# Patient Record
Sex: MALE | Race: WHITE | NOT HISPANIC OR LATINO | Employment: OTHER | URBAN - METROPOLITAN AREA
[De-identification: names, ages, dates, MRNs, and addresses within clinical notes are randomized per-mention and may not be internally consistent; named-entity substitution may affect disease eponyms.]

---

## 2017-01-16 ENCOUNTER — COMMUNICATION - HEALTHEAST (OUTPATIENT)
Dept: FAMILY MEDICINE | Facility: CLINIC | Age: 67
End: 2017-01-16

## 2017-01-16 ENCOUNTER — OFFICE VISIT - HEALTHEAST (OUTPATIENT)
Dept: FAMILY MEDICINE | Facility: CLINIC | Age: 67
End: 2017-01-16

## 2017-01-16 DIAGNOSIS — E11.9 DIABETES MELLITUS (H): ICD-10-CM

## 2017-01-16 DIAGNOSIS — E78.5 HYPERLIPIDEMIA, UNSPECIFIED HYPERLIPIDEMIA TYPE: ICD-10-CM

## 2017-01-16 DIAGNOSIS — E11.9 DM (DIABETES MELLITUS) (H): ICD-10-CM

## 2017-01-16 DIAGNOSIS — G47.30 SLEEP APNEA: ICD-10-CM

## 2017-01-16 LAB — HBA1C MFR BLD: 7.1 % (ref 3.5–6)

## 2017-01-16 ASSESSMENT — MIFFLIN-ST. JEOR: SCORE: 1629.89

## 2017-01-17 LAB
CHOLEST SERPL-MCNC: 146 MG/DL
FASTING STATUS PATIENT QL REPORTED: NORMAL
HDLC SERPL-MCNC: 41 MG/DL
LDLC SERPL CALC-MCNC: 88 MG/DL
TRIGL SERPL-MCNC: 85 MG/DL

## 2017-01-18 ENCOUNTER — COMMUNICATION - HEALTHEAST (OUTPATIENT)
Dept: FAMILY MEDICINE | Facility: CLINIC | Age: 67
End: 2017-01-18

## 2017-02-20 ENCOUNTER — COMMUNICATION - HEALTHEAST (OUTPATIENT)
Dept: FAMILY MEDICINE | Facility: CLINIC | Age: 67
End: 2017-02-20

## 2017-02-20 DIAGNOSIS — E11.9 DM (DIABETES MELLITUS) (H): ICD-10-CM

## 2017-05-25 ENCOUNTER — COMMUNICATION - HEALTHEAST (OUTPATIENT)
Dept: FAMILY MEDICINE | Facility: CLINIC | Age: 67
End: 2017-05-25

## 2017-05-25 DIAGNOSIS — E78.5 HYPERLIPIDEMIA: ICD-10-CM

## 2017-05-26 ENCOUNTER — COMMUNICATION - HEALTHEAST (OUTPATIENT)
Dept: FAMILY MEDICINE | Facility: CLINIC | Age: 67
End: 2017-05-26

## 2017-05-26 DIAGNOSIS — E78.5 HYPERLIPIDEMIA: ICD-10-CM

## 2017-08-24 ENCOUNTER — COMMUNICATION - HEALTHEAST (OUTPATIENT)
Dept: FAMILY MEDICINE | Facility: CLINIC | Age: 67
End: 2017-08-24

## 2017-08-24 DIAGNOSIS — E11.9 DM (DIABETES MELLITUS) (H): ICD-10-CM

## 2017-09-15 ENCOUNTER — OFFICE VISIT - HEALTHEAST (OUTPATIENT)
Dept: FAMILY MEDICINE | Facility: CLINIC | Age: 67
End: 2017-09-15

## 2017-09-15 DIAGNOSIS — E11.9 DIABETES MELLITUS (H): ICD-10-CM

## 2017-09-15 DIAGNOSIS — E78.5 HYPERLIPIDEMIA, UNSPECIFIED HYPERLIPIDEMIA TYPE: ICD-10-CM

## 2017-09-15 LAB — HBA1C MFR BLD: 6.7 % (ref 3.5–6)

## 2017-09-19 ENCOUNTER — COMMUNICATION - HEALTHEAST (OUTPATIENT)
Dept: FAMILY MEDICINE | Facility: CLINIC | Age: 67
End: 2017-09-19

## 2017-09-27 ENCOUNTER — RECORDS - HEALTHEAST (OUTPATIENT)
Dept: ADMINISTRATIVE | Facility: OTHER | Age: 67
End: 2017-09-27

## 2018-04-10 ENCOUNTER — COMMUNICATION - HEALTHEAST (OUTPATIENT)
Dept: FAMILY MEDICINE | Facility: CLINIC | Age: 68
End: 2018-04-10

## 2018-04-10 DIAGNOSIS — E78.5 HYPERLIPIDEMIA: ICD-10-CM

## 2018-07-03 ENCOUNTER — OFFICE VISIT - HEALTHEAST (OUTPATIENT)
Dept: FAMILY MEDICINE | Facility: CLINIC | Age: 68
End: 2018-07-03

## 2018-07-03 DIAGNOSIS — E11.9 DIABETES MELLITUS (H): ICD-10-CM

## 2018-07-03 DIAGNOSIS — Z12.11 SCREEN FOR COLON CANCER: ICD-10-CM

## 2018-07-03 LAB
HBA1C MFR BLD: 7.4 % (ref 3.5–6)
LDLC SERPL CALC-MCNC: 82 MG/DL

## 2018-07-05 ENCOUNTER — COMMUNICATION - HEALTHEAST (OUTPATIENT)
Dept: FAMILY MEDICINE | Facility: CLINIC | Age: 68
End: 2018-07-05

## 2018-08-27 ENCOUNTER — COMMUNICATION - HEALTHEAST (OUTPATIENT)
Dept: FAMILY MEDICINE | Facility: CLINIC | Age: 68
End: 2018-08-27

## 2018-08-27 DIAGNOSIS — E11.9 DM (DIABETES MELLITUS) (H): ICD-10-CM

## 2018-10-29 ENCOUNTER — COMMUNICATION - HEALTHEAST (OUTPATIENT)
Dept: FAMILY MEDICINE | Facility: CLINIC | Age: 68
End: 2018-10-29

## 2018-10-29 DIAGNOSIS — E78.5 HYPERLIPIDEMIA: ICD-10-CM

## 2019-07-08 ENCOUNTER — COMMUNICATION - HEALTHEAST (OUTPATIENT)
Dept: FAMILY MEDICINE | Facility: CLINIC | Age: 69
End: 2019-07-08

## 2019-07-10 ENCOUNTER — OFFICE VISIT - HEALTHEAST (OUTPATIENT)
Dept: FAMILY MEDICINE | Facility: CLINIC | Age: 69
End: 2019-07-10

## 2019-07-10 ENCOUNTER — COMMUNICATION - HEALTHEAST (OUTPATIENT)
Dept: TELEHEALTH | Facility: CLINIC | Age: 69
End: 2019-07-10

## 2019-07-10 DIAGNOSIS — Z23 NEED FOR VACCINATION: ICD-10-CM

## 2019-07-10 DIAGNOSIS — E11.9 DM (DIABETES MELLITUS) (H): ICD-10-CM

## 2019-07-10 DIAGNOSIS — Z00.00 HEALTHCARE MAINTENANCE: ICD-10-CM

## 2019-07-10 DIAGNOSIS — E78.2 MIXED HYPERLIPIDEMIA: ICD-10-CM

## 2019-07-10 DIAGNOSIS — Z12.11 SCREEN FOR COLON CANCER: ICD-10-CM

## 2019-07-10 DIAGNOSIS — E11.9 DIABETES MELLITUS, TYPE 2 (H): ICD-10-CM

## 2019-07-10 LAB
CHOLEST SERPL-MCNC: 145 MG/DL
FASTING STATUS PATIENT QL REPORTED: NORMAL
HBA1C MFR BLD: 8.1 % (ref 3.5–6)
HDLC SERPL-MCNC: 41 MG/DL
LDLC SERPL CALC-MCNC: 89 MG/DL
TRIGL SERPL-MCNC: 77 MG/DL

## 2019-07-10 ASSESSMENT — MIFFLIN-ST. JEOR: SCORE: 1638.06

## 2019-07-16 ENCOUNTER — COMMUNICATION - HEALTHEAST (OUTPATIENT)
Dept: FAMILY MEDICINE | Facility: CLINIC | Age: 69
End: 2019-07-16

## 2019-07-29 ENCOUNTER — COMMUNICATION - HEALTHEAST (OUTPATIENT)
Dept: FAMILY MEDICINE | Facility: CLINIC | Age: 69
End: 2019-07-29

## 2019-07-29 DIAGNOSIS — E11.9 DIABETES MELLITUS, TYPE 2 (H): ICD-10-CM

## 2019-07-31 ENCOUNTER — COMMUNICATION - HEALTHEAST (OUTPATIENT)
Dept: FAMILY MEDICINE | Facility: CLINIC | Age: 69
End: 2019-07-31

## 2019-09-13 ENCOUNTER — COMMUNICATION - HEALTHEAST (OUTPATIENT)
Dept: FAMILY MEDICINE | Facility: CLINIC | Age: 69
End: 2019-09-13

## 2019-09-26 ENCOUNTER — COMMUNICATION - HEALTHEAST (OUTPATIENT)
Dept: FAMILY MEDICINE | Facility: CLINIC | Age: 69
End: 2019-09-26

## 2019-09-26 DIAGNOSIS — E78.5 HYPERLIPIDEMIA: ICD-10-CM

## 2019-10-25 ENCOUNTER — RECORDS - HEALTHEAST (OUTPATIENT)
Dept: ADMINISTRATIVE | Facility: OTHER | Age: 69
End: 2019-10-25

## 2019-10-31 ENCOUNTER — RECORDS - HEALTHEAST (OUTPATIENT)
Dept: HEALTH INFORMATION MANAGEMENT | Facility: CLINIC | Age: 69
End: 2019-10-31

## 2019-11-04 ENCOUNTER — AMBULATORY - HEALTHEAST (OUTPATIENT)
Dept: NURSING | Facility: CLINIC | Age: 69
End: 2019-11-04

## 2019-11-04 DIAGNOSIS — Z23 NEED FOR INFLUENZA VACCINATION: ICD-10-CM

## 2020-02-13 ENCOUNTER — COMMUNICATION - HEALTHEAST (OUTPATIENT)
Dept: LAB | Facility: CLINIC | Age: 70
End: 2020-02-13

## 2020-02-13 DIAGNOSIS — E11.9 TYPE 2 DIABETES MELLITUS WITHOUT COMPLICATION, WITHOUT LONG-TERM CURRENT USE OF INSULIN (H): ICD-10-CM

## 2020-02-14 ENCOUNTER — AMBULATORY - HEALTHEAST (OUTPATIENT)
Dept: LAB | Facility: CLINIC | Age: 70
End: 2020-02-14

## 2020-02-14 ENCOUNTER — COMMUNICATION - HEALTHEAST (OUTPATIENT)
Dept: FAMILY MEDICINE | Facility: CLINIC | Age: 70
End: 2020-02-14

## 2020-02-14 DIAGNOSIS — E11.9 DIABETES MELLITUS, TYPE 2 (H): ICD-10-CM

## 2020-02-14 DIAGNOSIS — E11.9 TYPE 2 DIABETES MELLITUS WITHOUT COMPLICATION, WITHOUT LONG-TERM CURRENT USE OF INSULIN (H): ICD-10-CM

## 2020-02-14 LAB
ALBUMIN SERPL-MCNC: 4.2 G/DL (ref 3.5–5)
ALP SERPL-CCNC: 85 U/L (ref 45–120)
ALT SERPL W P-5'-P-CCNC: 25 U/L (ref 0–45)
ANION GAP SERPL CALCULATED.3IONS-SCNC: 9 MMOL/L (ref 5–18)
AST SERPL W P-5'-P-CCNC: 21 U/L (ref 0–40)
BILIRUB SERPL-MCNC: 0.8 MG/DL (ref 0–1)
BUN SERPL-MCNC: 16 MG/DL (ref 8–22)
CALCIUM SERPL-MCNC: 9.4 MG/DL (ref 8.5–10.5)
CHLORIDE BLD-SCNC: 102 MMOL/L (ref 98–107)
CHOLEST SERPL-MCNC: 146 MG/DL
CO2 SERPL-SCNC: 27 MMOL/L (ref 22–31)
CREAT SERPL-MCNC: 0.97 MG/DL (ref 0.7–1.3)
CREAT UR-MCNC: 154.4 MG/DL
FASTING STATUS PATIENT QL REPORTED: YES
GFR SERPL CREATININE-BSD FRML MDRD: >60 ML/MIN/1.73M2
GLUCOSE BLD-MCNC: 182 MG/DL (ref 70–125)
HBA1C MFR BLD: 7.2 % (ref 3.5–6)
HDLC SERPL-MCNC: 37 MG/DL
LDLC SERPL CALC-MCNC: 84 MG/DL
MICROALBUMIN UR-MCNC: 1.38 MG/DL (ref 0–1.99)
MICROALBUMIN/CREAT UR: 8.9 MG/G
POTASSIUM BLD-SCNC: 4.2 MMOL/L (ref 3.5–5)
PROT SERPL-MCNC: 7.3 G/DL (ref 6–8)
SODIUM SERPL-SCNC: 138 MMOL/L (ref 136–145)
TRIGL SERPL-MCNC: 123 MG/DL

## 2020-02-16 ENCOUNTER — COMMUNICATION - HEALTHEAST (OUTPATIENT)
Dept: FAMILY MEDICINE | Facility: CLINIC | Age: 70
End: 2020-02-16

## 2020-02-19 ENCOUNTER — COMMUNICATION - HEALTHEAST (OUTPATIENT)
Dept: FAMILY MEDICINE | Facility: CLINIC | Age: 70
End: 2020-02-19

## 2020-02-19 DIAGNOSIS — E11.9 DIABETES MELLITUS, TYPE 2 (H): ICD-10-CM

## 2020-02-21 ENCOUNTER — COMMUNICATION - HEALTHEAST (OUTPATIENT)
Dept: FAMILY MEDICINE | Facility: CLINIC | Age: 70
End: 2020-02-21

## 2020-02-21 DIAGNOSIS — E11.9 DIABETES MELLITUS, TYPE 2 (H): ICD-10-CM

## 2020-05-15 ENCOUNTER — COMMUNICATION - HEALTHEAST (OUTPATIENT)
Dept: FAMILY MEDICINE | Facility: CLINIC | Age: 70
End: 2020-05-15

## 2020-05-15 DIAGNOSIS — E11.9 DIABETES MELLITUS, TYPE 2 (H): ICD-10-CM

## 2020-06-19 ENCOUNTER — AMBULATORY - HEALTHEAST (OUTPATIENT)
Dept: LAB | Facility: CLINIC | Age: 70
End: 2020-06-19

## 2020-06-19 DIAGNOSIS — E11.9 TYPE 2 DIABETES MELLITUS WITHOUT COMPLICATION, WITHOUT LONG-TERM CURRENT USE OF INSULIN (H): ICD-10-CM

## 2020-06-19 LAB — HBA1C MFR BLD: 7 % (ref 3.5–6)

## 2020-07-13 ENCOUNTER — COMMUNICATION - HEALTHEAST (OUTPATIENT)
Dept: FAMILY MEDICINE | Facility: CLINIC | Age: 70
End: 2020-07-13

## 2020-08-03 ENCOUNTER — COMMUNICATION - HEALTHEAST (OUTPATIENT)
Dept: FAMILY MEDICINE | Facility: CLINIC | Age: 70
End: 2020-08-03

## 2020-08-03 DIAGNOSIS — E11.9 DIABETES MELLITUS, TYPE 2 (H): ICD-10-CM

## 2020-09-28 ENCOUNTER — COMMUNICATION - HEALTHEAST (OUTPATIENT)
Dept: FAMILY MEDICINE | Facility: CLINIC | Age: 70
End: 2020-09-28

## 2020-09-28 DIAGNOSIS — E78.5 HYPERLIPIDEMIA: ICD-10-CM

## 2020-10-26 ENCOUNTER — RECORDS - HEALTHEAST (OUTPATIENT)
Dept: ADMINISTRATIVE | Facility: OTHER | Age: 70
End: 2020-10-26

## 2020-10-26 LAB — RETINOPATHY: NEGATIVE

## 2020-10-30 ENCOUNTER — RECORDS - HEALTHEAST (OUTPATIENT)
Dept: HEALTH INFORMATION MANAGEMENT | Facility: CLINIC | Age: 70
End: 2020-10-30

## 2020-11-12 ENCOUNTER — COMMUNICATION - HEALTHEAST (OUTPATIENT)
Dept: SCHEDULING | Facility: CLINIC | Age: 70
End: 2020-11-12

## 2020-11-19 ENCOUNTER — RECORDS - HEALTHEAST (OUTPATIENT)
Dept: ADMINISTRATIVE | Facility: OTHER | Age: 70
End: 2020-11-19

## 2021-02-08 ENCOUNTER — COMMUNICATION - HEALTHEAST (OUTPATIENT)
Dept: FAMILY MEDICINE | Facility: CLINIC | Age: 71
End: 2021-02-08

## 2021-02-08 DIAGNOSIS — E78.5 HYPERLIPIDEMIA: ICD-10-CM

## 2021-03-08 ENCOUNTER — OFFICE VISIT - HEALTHEAST (OUTPATIENT)
Dept: FAMILY MEDICINE | Facility: CLINIC | Age: 71
End: 2021-03-08

## 2021-03-08 ENCOUNTER — COMMUNICATION - HEALTHEAST (OUTPATIENT)
Dept: TELEHEALTH | Facility: CLINIC | Age: 71
End: 2021-03-08

## 2021-03-08 DIAGNOSIS — E78.2 MIXED HYPERLIPIDEMIA: ICD-10-CM

## 2021-03-08 DIAGNOSIS — E11.9 TYPE 2 DIABETES MELLITUS WITHOUT COMPLICATION, WITHOUT LONG-TERM CURRENT USE OF INSULIN (H): ICD-10-CM

## 2021-03-08 LAB
ALBUMIN SERPL-MCNC: 4.3 G/DL (ref 3.5–5)
ALP SERPL-CCNC: 95 U/L (ref 45–120)
ALT SERPL W P-5'-P-CCNC: 26 U/L (ref 0–45)
ANION GAP SERPL CALCULATED.3IONS-SCNC: 10 MMOL/L (ref 5–18)
AST SERPL W P-5'-P-CCNC: 19 U/L (ref 0–40)
BILIRUB SERPL-MCNC: 0.5 MG/DL (ref 0–1)
BUN SERPL-MCNC: 17 MG/DL (ref 8–28)
CALCIUM SERPL-MCNC: 8.7 MG/DL (ref 8.5–10.5)
CHLORIDE BLD-SCNC: 107 MMOL/L (ref 98–107)
CHOLEST SERPL-MCNC: 133 MG/DL
CO2 SERPL-SCNC: 25 MMOL/L (ref 22–31)
CREAT SERPL-MCNC: 1.09 MG/DL (ref 0.7–1.3)
CREAT UR-MCNC: 172.2 MG/DL
GFR SERPL CREATININE-BSD FRML MDRD: >60 ML/MIN/1.73M2
GLUCOSE BLD-MCNC: 170 MG/DL (ref 70–125)
HBA1C MFR BLD: 7.9 %
HDLC SERPL-MCNC: 39 MG/DL
LDLC SERPL CALC-MCNC: 71 MG/DL
MICROALBUMIN UR-MCNC: 1.14 MG/DL (ref 0–1.99)
MICROALBUMIN/CREAT UR: 6.6 MG/G
POTASSIUM BLD-SCNC: 4.3 MMOL/L (ref 3.5–5)
PROT SERPL-MCNC: 7.4 G/DL (ref 6–8)
SODIUM SERPL-SCNC: 142 MMOL/L (ref 136–145)
TRIGL SERPL-MCNC: 113 MG/DL

## 2021-03-11 ENCOUNTER — COMMUNICATION - HEALTHEAST (OUTPATIENT)
Dept: FAMILY MEDICINE | Facility: CLINIC | Age: 71
End: 2021-03-11

## 2021-05-13 ENCOUNTER — COMMUNICATION - HEALTHEAST (OUTPATIENT)
Dept: FAMILY MEDICINE | Facility: CLINIC | Age: 71
End: 2021-05-13

## 2021-05-24 ENCOUNTER — COMMUNICATION - HEALTHEAST (OUTPATIENT)
Dept: FAMILY MEDICINE | Facility: CLINIC | Age: 71
End: 2021-05-24

## 2021-05-24 DIAGNOSIS — E78.5 HYPERLIPIDEMIA: ICD-10-CM

## 2021-05-24 DIAGNOSIS — E11.9 DIABETES MELLITUS, TYPE 2 (H): ICD-10-CM

## 2021-05-25 RX ORDER — SIMVASTATIN 20 MG
TABLET ORAL
Qty: 90 TABLET | Refills: 3 | Status: SHIPPED | OUTPATIENT
Start: 2021-05-25 | End: 2022-08-24

## 2021-05-25 RX ORDER — GLIPIZIDE 5 MG/1
TABLET, FILM COATED, EXTENDED RELEASE ORAL
Qty: 90 TABLET | Refills: 3 | Status: SHIPPED | OUTPATIENT
Start: 2021-05-25 | End: 2022-05-23

## 2021-05-30 VITALS — HEIGHT: 69 IN | WEIGHT: 195 LBS | BODY MASS INDEX: 28.88 KG/M2

## 2021-05-30 NOTE — TELEPHONE ENCOUNTER
This patient is well over due for an office visit to follow-up on diabetes  He was seen last 7/3/2018 and instructed to follow-up in 6 months      Please help schedule a DM check with an available provider

## 2021-05-30 NOTE — TELEPHONE ENCOUNTER
Return to taking 2 tablets of metformin per day.  I have sent a prescription for a new medication called glipizide which is to be taken once a day in addition to the 2 times a day of metformin.  This should work in a different way and not cause diarrhea with blood sugar.  As always would recommend he follow a diet low in sugar and carbohydrates to also help achieve ideal blood sugar control

## 2021-05-30 NOTE — TELEPHONE ENCOUNTER
Left message to call back for: Patient.  Information to relay to patient: Patient is overdue for a diabetic check with Dr. Curry.  Please help him schedule.

## 2021-05-30 NOTE — TELEPHONE ENCOUNTER
Patient Returning Call  Reason for call:  Calling clinic back  Information relayed to patient:  yes  Patient has additional questions:  No  If YES, what are your questions/concerns:  Scheduled for 7/9 for a diabetic check  Okay to leave a detailed message?: No call back needed

## 2021-05-30 NOTE — PROGRESS NOTES
I spent over40 minutes with the patient with greater than 50% spent discussing symptoms, treatment options, and coordination of care.     Assessment/Plan:    1. Diabetes mellitus, type 2 (H)  Hemoglobin A1c increased today at 8.1.  Will increase metformin to 1000 mg twice daily with meals. Referral placed to diabetes education to discuss diet and lifestyle modifications.  Diabetic foot exam completed today.  Patient has seen ophthalmology within the past year.  Will check microalbumin today.  Anticipate follow-up in clinic in 3 months to recheck A1c.    - Glycosylated Hemoglobin A1c  - Microalbumin, Random Urine  - metFORMIN (GLUCOPHAGE) 500 MG tablet; Take 2 tablets (1,000 mg total) by mouth 2 (two) times a day with meals.  Dispense: 180 tablet; Refill: 3  - Ambulatory referral to Diabetes Education Program (CDE)    2. Mixed hyperlipidemia  History of hyperlipidemia.  Will check fasting lipids today.  Continue with simvastatin as prescribed.  - Lipid [place] FASTING    3. Healthcare maintenance  Patient is due for tetanus booster.  Administered today without complication.  - Tdap vaccine,  8yo or older,  IM    4. Screen for colon cancer  Discussed importance of routine colon cancer screening.  Referral placed for colonoscopy.  - Ambulatory referral for Colonoscopy    6. Need for vaccination  - Pneumococcal conjugate vaccine 13-valent 6wks-17yrs; >50yrs      The following high BMI interventions were performed this visit: encouragement to exercise and lifestyle education regarding diet    Subjective:    Ankush Zaidi is a 69 year old male seen today for follow-up visit of type 2 diabetes and hyperlipidemia.  Patient was last seen in clinic roughly 1 year ago.  At that time A1c had increased from 6.7-7.4.  Patient is currently on metformin 500 mg twice daily.  Tolerating well.  He is checking blood sugars at least once a day.  Typically blood sugars are less than 150 fasting.  On occasion will have a reading above  175.  Patient's A1c has increased today to 8.1.  Feels that it is likely diet related.  States that he is not watching diet like he should be.  Will eat fast food on occasion as well as heavy carbohydrate meals including spaghetti.  Tries to consume diet soda.  Patient tries to stay fairly active day today, walks often.  He continues use of simvastatin and aspirin daily in addition to metformin.  Otherwise no regular medications.  He feels well overall today.  Denies any chest pain or shortness of breath.  He is due for routine health maintenance today.  Patient sees ophthalmology annually.  He does not have any additional concerns today.  Review of systems is as stated in HPI, and the remainder of the 10 system review is otherwise unremarkable.    Past Medical History, Family History, and Social History reviewed.    History reviewed. No pertinent surgical history.     No family history on file.     History reviewed. No pertinent past medical history.     Social History     Tobacco Use     Smoking status: Former Smoker     Types: Cigarettes     Last attempt to quit: 1969     Years since quittin.8     Smokeless tobacco: Never Used   Substance Use Topics     Alcohol use: Yes     Alcohol/week: 2.0 oz     Types: 4 drink(s) per week     Drug use: No        Current Outpatient Medications   Medication Sig Dispense Refill     aspirin 81 MG EC tablet Take 81 mg by mouth daily.       cinnamon bark 500 mg capsule Take 500 mg by mouth daily.       metFORMIN (GLUCOPHAGE) 500 MG tablet Take 2 tablets (1,000 mg total) by mouth 2 (two) times a day with meals. 180 tablet 3     simvastatin (ZOCOR) 20 MG tablet Take 1 tablet (20 mg total) by mouth at bedtime. 90 tablet 2     No current facility-administered medications for this visit.           Objective:    Vitals:    07/10/19 0925   BP: 132/80   Patient Site: Right Arm   Patient Position: Sitting   Cuff Size: Adult Regular   Pulse: 66   Weight: 196 lb 12.8 oz (89.3 kg)  "  Height: 5' 9\" (1.753 m)      Body mass index is 29.06 kg/m .      General Appearance:  Alert, cooperative, no distress, appears stated age   HEENT:  Normal.  No acute findings.   Neck: Supple, symmetrical, no adenopathy.   Lungs:   Clear to auscultation bilaterally, respirations unlabored.  No expiratory wheeze or inspiratory crackles noted.   Heart:  Regular rate and rhythm, S1, S2 normal, no murmur, rub or gallop   Extremities:  Normal diabetic foot exam today.  Extremities normal.  No cyanosis or edema   Skin: Warm, dry.  Skin color, texture, turgor normal, no rashes or lesions   Neurologic:  Alert and oriented x3.  Grossly intact.       This note has been dictated using voice recognition software. Any grammatical or context distortions are unintentional and inherent to the use of this software.     "

## 2021-05-30 NOTE — TELEPHONE ENCOUNTER
Patient Returning Call  Reason for call:  Return call  Information relayed to patient:  Patient was informed of Lance Curry MD's message below about the metformin Rx change and the new Rx for glipizide.  Patient has additional questions:  No  If YES, what are your questions/concerns:  n/a  Okay to leave a detailed message?: No call back needed

## 2021-05-30 NOTE — TELEPHONE ENCOUNTER
Orders being requested: Hemoglobin A1c  Reason service is needed/diagnosis: Patient states he is due to have this done  When are orders needed by: not urgent, but patient stated he would like to come in to have this drawn today if possible.  Where to send Orders: Chart  Okay to leave detailed message?  Yes  [phone]    Patient is requesting a phone call when the order has been placed so he can set up a time to have this test drawn.

## 2021-05-30 NOTE — TELEPHONE ENCOUNTER
"Medication Question or Clarification  Who is calling: Patient  What medication are you calling about? (include dose and sig)     metFORMIN (GLUCOPHAGE) 500 MG tablet 180 tablet 3 7/10/2019     Sig - Route: Take 2 tablets (1,000 mg total) by mouth 2 (two) times a day with meals. - Oral    Sent to pharmacy as: metFORMIN (GLUCOPHAGE) 500 MG tablet      Who prescribed the medication?:Gemini Bain, MARIO ALBERTO  What is your question/concern?: The patient states that since the increase of the above prescription that he is unable to keep anything in his stomach. Symptoms present \"Off and onn for two weeks.\"     The patient would like to know if OK to go back to two tablets of metformin and work to on diet instead of staying of the increased dose of the metformin.     Pharmacy: [id number] - [place], [place] - [address]   Okay to leave a detailed message?: No  Site CMT - Please call the pharmacy to obtain any additional needed information.  "

## 2021-05-31 VITALS — BODY MASS INDEX: 28.5 KG/M2 | WEIGHT: 193 LBS

## 2021-06-01 VITALS — WEIGHT: 196 LBS | BODY MASS INDEX: 28.94 KG/M2

## 2021-06-01 NOTE — TELEPHONE ENCOUNTER
Reason contacted:  Medication problem  Information relayed:  Below message. Patient will begin taking Metformin 500 mg twice daily as this is the dose patient has previously tolerated.     He will follow-up in no more than 3 months.     Additional questions:  No  Further follow-up needed:  No  Okay to leave a detailed message:  No

## 2021-06-01 NOTE — TELEPHONE ENCOUNTER
Patient would like a refill on  His simvasatin  20mg  Called to [facility] pharmacy  [phone]

## 2021-06-01 NOTE — TELEPHONE ENCOUNTER
There are many choices of other medications.  Choices for oral medications are somewhat limited.  Have him stop taking the glipizide.  Return to taking the previous dose of metformin that did not cause problems.  Allow symptoms to resolve.  Work diligently at diet to improve blood sugar.  Schedule follow-up with me for no more than 3 months and we will reevaluate the situation and discuss options for other medications if necessary.

## 2021-06-01 NOTE — TELEPHONE ENCOUNTER
Medication Question or Clarification  Who is calling: Patient  What medication are you calling about? (include dose and sig)    Disp Refills Start End    glipiZIDE (GLUCOTROL XL) 5 MG 24 hr tablet 30 tablet 3 7/29/2019     Sig - Route: Take 1 tablet (5 mg total) by mouth daily with breakfast. - Oral    Sent to pharmacy as: glipiZIDE (GLUCOTROL XL) 5 MG 24 hr tablet    E-Prescribing Status: Receipt confirmed by pharmacy (7/29/2019  4:50         Who prescribed the medication?: Lance Curry MD    What is your question/concern?: Patient is experiencing side affects with this medication.  Has frequent stooling, a lot of this is very loose, stomach is always in a knot.  Would like to trial a new medication that will hopefully not have these or any side affects.  Pharmacy: [id number]  Okay to leave a detailed message?: Yes  Site CMT - Please call the pharmacy to obtain any additional needed information.    Patient would really appreciate a callback today.

## 2021-06-03 VITALS — HEIGHT: 69 IN | WEIGHT: 196.8 LBS | BODY MASS INDEX: 29.15 KG/M2

## 2021-06-05 VITALS
SYSTOLIC BLOOD PRESSURE: 126 MMHG | DIASTOLIC BLOOD PRESSURE: 76 MMHG | BODY MASS INDEX: 29.53 KG/M2 | WEIGHT: 200 LBS | OXYGEN SATURATION: 99 % | HEART RATE: 70 BPM

## 2021-06-06 NOTE — TELEPHONE ENCOUNTER
Who is calling:  Patient  Reason for Call:    Patient is wanting to  medication today.  Thank you.  Date of last appointment with primary care: 7/10/19  Okay to leave a detailed message: Yes

## 2021-06-06 NOTE — TELEPHONE ENCOUNTER
Patient has run out of the medications, needs ASAP today please    Refill Request  Did you contact pharmacy: Yes  Medication name:   Requested Prescriptions     Pending Prescriptions Disp Refills     glipiZIDE (GLUCOTROL XL) 5 MG 24 hr tablet [Pharmacy Med Name: glipiZIDE ER 5 MG Oral Tablet Extended Release 24 Hour] 30 tablet 0     Sig: TAKE 1 TABLET BY MOUTH ONCE DAILY WITH BREAKFAST     Who prescribed the medication: Lance Curry MD  Requested Pharmacy: [facility]  Is patient out of medication: Yes  Patient notified refills processed in 3 business days:  yes  Okay to leave a detailed message: yes

## 2021-06-06 NOTE — TELEPHONE ENCOUNTER
Refill Approved    Rx renewed per Medication Renewal Policy. Medication was last renewed on 2/21/2020. Message sent to pharmacy.     Unique Meyer, [facility] Triage/Med Refill 2/22/2020     Requested Prescriptions   Pending Prescriptions Disp Refills     glipiZIDE (GLUCOTROL XL) 5 MG 24 hr tablet 30 tablet 3     Sig: Take 1 tablet (5 mg total) by mouth daily with breakfast.       Oral Hypoglycemics Refill Protocol Failed - 2/19/2020  3:23 PM        Failed - Visit with PCP or prescribing provider visit in last 6 months       Last office visit with prescriber/PCP: Visit date not found OR same dept: 7/10/2019 Gemini Bain CNP OR same specialty: 7/10/2019 Gemini Bain CNP Last physical: Visit date not found Last MTM visit: Visit date not found         Next appt within 3 mo: Visit date not found  Next physical within 3 mo: Visit date not found  Prescriber OR PCP: Lance Curry MD  Last diagnosis associated with med order: 1. Diabetes mellitus, type 2 (H)  - glipiZIDE (GLUCOTROL XL) 5 MG 24 hr tablet; Take 1 tablet (5 mg total) by mouth daily with breakfast.  Dispense: 30 tablet; Refill: 3     If protocol passes may refill for 12 months if within 3 months of last provider visit (or a total of 15 months).           Passed - A1C in last 6 months     Hemoglobin A1c   Date Value Ref Range Status   02/14/2020 7.2 (H) 3.5 - 6.0 % Final               Passed - Microalbumin in last year      Microalbumin, Random Urine   Date Value Ref Range Status   02/14/2020 1.38 0.00 - 1.99 mg/dL Final                  Passed - Blood pressure in last year     BP Readings from Last 1 Encounters:   07/10/19 132/80             Passed - Serum creatinine in last year     Creatinine   Date Value Ref Range Status   02/14/2020 0.97 0.70 - 1.30 mg/dL Final

## 2021-06-06 NOTE — TELEPHONE ENCOUNTER
RN cannot approve Refill Request    RN can NOT refill this medication Protocol failed and NO refill given       Ashwini Gonzalez, [facility] Triage/Med Refill 2/21/2020    Requested Prescriptions   Pending Prescriptions Disp Refills     glipiZIDE (GLUCOTROL XL) 5 MG 24 hr tablet [Pharmacy Med Name: glipiZIDE ER 5 MG Oral Tablet Extended Release 24 Hour] 30 tablet 0     Sig: TAKE 1 TABLET BY MOUTH ONCE DAILY WITH BREAKFAST       Oral Hypoglycemics Refill Protocol Failed - 2/21/2020 11:06 AM        Failed - Visit with PCP or prescribing provider visit in last 6 months       Last office visit with prescriber/PCP: Visit date not found OR same dept: 7/10/2019 Gemini Bain CNP OR same specialty: 7/10/2019 Geimni Bain CNP Last physical: Visit date not found Last MTM visit: Visit date not found         Next appt within 3 mo: Visit date not found  Next physical within 3 mo: Visit date not found  Prescriber OR PCP: Lance Curry MD  Last diagnosis associated with med order: 1. Diabetes mellitus, type 2 (H)  - glipiZIDE (GLUCOTROL XL) 5 MG 24 hr tablet [Pharmacy Med Name: glipiZIDE ER 5 MG Oral Tablet Extended Release 24 Hour]; TAKE 1 TABLET BY MOUTH ONCE DAILY WITH BREAKFAST  Dispense: 30 tablet; Refill: 0     If protocol passes may refill for 12 months if within 3 months of last provider visit (or a total of 15 months).           Passed - A1C in last 6 months     Hemoglobin A1c   Date Value Ref Range Status   02/14/2020 7.2 (H) 3.5 - 6.0 % Final               Passed - Microalbumin in last year      Microalbumin, Random Urine   Date Value Ref Range Status   02/14/2020 1.38 0.00 - 1.99 mg/dL Final                  Passed - Blood pressure in last year     BP Readings from Last 1 Encounters:   07/10/19 132/80             Passed - Serum creatinine in last year     Creatinine   Date Value Ref Range Status   02/14/2020 0.97 0.70 - 1.30 mg/dL Final

## 2021-06-08 NOTE — PROGRESS NOTES
" Patient ID: Ankush Zaidi is a 66 y.o. male.  Visit Vitals     /76     Pulse 73     Ht 5' 9\" (1.753 m)     Wt 195 lb (88.5 kg)     SpO2 99%     BMI 28.8 kg/m2     Assessment/Plan:              Diagnoses and all orders for this visit:    Diabetes mellitus  -     Glycosylated Hemoglobin A1c    Hyperlipidemia, unspecified hyperlipidemia type  -     Lipid [place] FASTING    Sleep apnea      DISCUSSION   Continue current medications.  Check cholesterol today.  Follow up in 6 months.  Patient declines consideration for reevaluation of his history of underlying sleep apnea.  DATA:      A1C  HEMOGLOBIN A1C (%)   Date Value   01/16/2017 7.1 (H)   05/27/2016 7.1 (H)   09/11/2015 6.3 (H)      Cholesterol:   YNo results found for: LDLCALC   Blood Pressure:   BP Readings from Last 3 Encounters:   01/16/17 122/76   05/27/16 128/76   09/11/15 126/76     Urine Microalbumin  Lab Results   Component Value Date    MICROALBUR 0.85 05/27/2016       Wt Readings from Last 3 Encounters:   01/16/17 195 lb (88.5 kg)   05/27/16 200 lb 12.8 oz (91.1 kg)   11/17/15 199 lb 8 oz (90.5 kg)       Body mass index is 28.8 kg/(m^2).    The following high BMI interventions were performed this visit: encouragement to exercise    Subjective:     HPI    Ankush Zaidi is a 66-year-old man with type 2 diabetes.  On metformin.  Checks blood sugars.  Noted a few high readings over the last several months.  Reports no significant concerns no hypoglycemia.  Reviewed monitoring parameters.  No symptoms or previous findings of neuropathy.  Normal eye exam from August 2016.  No chest pain or shortness of breath.  Blood pressures very well controlled.  No microbial anuria.  Cholesterol is elevated on statin therapy.  Also takes baby aspirin.    He does have a history of sleep apnea.  Had been started on treatment with CPAP.  Did not tolerate treatment gradually tapered off of treatment.  In the past have discussed possibility of reevaluation he declines. " " He reports he sleeps well.  There is some question as to whether there could be underlying symptoms it is uncertain at this time.    Review of Systems  Complete review of systems is obtained.  Other than the specific considerations noted above complete review of systems is negative.          Objective:   Medications:  Current Outpatient Prescriptions   Medication Sig     aspirin 81 MG EC tablet Take 81 mg by mouth daily.     cinnamon bark 500 mg capsule Take 500 mg by mouth daily.     metFORMIN (GLUCOPHAGE) 500 MG tablet Take 1 tablet (500 mg total) by mouth 2 (two) times a day with meals.     simvastatin (ZOCOR) 20 MG tablet Take 1 tablet (20 mg total) by mouth bedtime.     Allergies:  Allergies   Allergen Reactions     Voltaren [Diclofenac Sodium]      Blood in urine     Tobacco:   reports that he quit smoking about 47 years ago. His smoking use included Cigarettes. He has never used smokeless tobacco.     Physical Exam      Visit Vitals     /76     Pulse 73     Ht 5' 9\" (1.753 m)     Wt 195 lb (88.5 kg)     SpO2 99%     BMI 28.8 kg/m2       Recent Results (from the past 240 hour(s))   Glycosylated Hemoglobin A1c   Result Value Ref Range    Hemoglobin A1c 7.1 (H) 3.5 - 6.0 %       General Appearance:    Alert, cooperative, no distress   Eyes:    No conjunctival irritation, no scleral icterus       Ears:    Normal TM's and external ear canals, both ears   Throat:   Lips, mucosa, and tongue normal; teeth and gums normal   Neck:   Supple, symmetrical, trachea midline, no adenopathy;        thyroid:  No enlargement/tenderness/nodules   Lungs:     Clear to auscultation bilaterally, respirations unlabored   Heart:    Regular rate and rhythm, S1 and S2 normal, no murmur, rub   or gallop   Abdomen:     Soft, non-tender, bowel sounds active all four quadrants,     no masses, no organomegaly   Extremities:   Extremities normal, atraumatic, no cyanosis or edema   Skin:   Skin color, texture, turgor normal, no rashes " or lesions   Neurologic:   Normal strength and sensation

## 2021-06-08 NOTE — TELEPHONE ENCOUNTER
RN cannot approve Refill Request    RN can NOT refill this medication Protocol failed and NO refill given.       Ashwini Gonzalez, [facility] Triage/Med Refill 5/18/2020    Requested Prescriptions   Pending Prescriptions Disp Refills     glipiZIDE (GLUCOTROL XL) 5 MG 24 hr tablet 90 tablet 3     Sig: TAKE 1 TABLET BY MOUTH ONCE DAILY WITH BREAKFAST       Oral Hypoglycemics Refill Protocol Failed - 5/15/2020  9:51 AM        Failed - Visit with PCP or prescribing provider visit in last 6 months       Last office visit with prescriber/PCP: Visit date not found OR same dept: 7/10/2019 Gemini Bain CNP OR same specialty: 7/10/2019 Gemini Bain CNP Last physical: Visit date not found Last MTM visit: Visit date not found         Next appt within 3 mo: Visit date not found  Next physical within 3 mo: Visit date not found  Prescriber OR PCP: Lance Curry MD  Last diagnosis associated with med order: 1. Diabetes mellitus, type 2 (H)  - glipiZIDE (GLUCOTROL XL) 5 MG 24 hr tablet; TAKE 1 TABLET BY MOUTH ONCE DAILY WITH BREAKFAST  Dispense: 90 tablet; Refill: 0     If protocol passes may refill for 12 months if within 3 months of last provider visit (or a total of 15 months).           Passed - A1C in last 6 months     Hemoglobin A1c   Date Value Ref Range Status   02/14/2020 7.2 (H) 3.5 - 6.0 % Final               Passed - Microalbumin in last year      Microalbumin, Random Urine   Date Value Ref Range Status   02/14/2020 1.38 0.00 - 1.99 mg/dL Final                  Passed - Blood pressure in last year     BP Readings from Last 1 Encounters:   07/10/19 132/80             Passed - Serum creatinine in last year     Creatinine   Date Value Ref Range Status   02/14/2020 0.97 0.70 - 1.30 mg/dL Final

## 2021-06-09 NOTE — TELEPHONE ENCOUNTER
Test Results  Who is calling?:  Patient  Who ordered the test:  Dr Curry  Type of test: Lab  Date of test:  6/19/2020  Where was the test performed:  In clinic  What are your questions/concerns?: Ankush is asking for the results of the A1c in June. Please call him with results and put a result letter in the mail.  Address on chart patient is wrong.  He states he lives  [facility].    Okay to leave a detailed message?:  Yes

## 2021-06-10 NOTE — TELEPHONE ENCOUNTER
RN cannot approve Refill Request    RN can NOT refill this medication Protocol failed and NO refill given. Last office visit: 7/3/2018 Lance Curry MD Last Physical: Visit date not found Last MTM visit: Visit date not found Last visit same specialty: 7/10/2019 Gemini Bain CNP.  Next visit within 3 mo: Visit date not found  Next physical within 3 mo: Visit date not found      Ashwini Gonzalez, [facility] Triage/Med Refill 8/3/2020    Requested Prescriptions   Pending Prescriptions Disp Refills     metFORMIN (GLUCOPHAGE) 500 MG tablet 180 tablet 3     Sig: Take 1 tablet (500 mg total) by mouth 2 (two) times a day with meals.       Metformin Refill Protocol Failed - 8/3/2020  9:33 AM        Failed - Blood pressure in last 12 months     BP Readings from Last 1 Encounters:   07/10/19 132/80             Failed - Visit with PCP or prescribing provider visit in last 6 months or next 3 months     Last office visit with prescriber/PCP: Visit date not found OR same dept: Visit date not found OR same specialty: 7/10/2019 Gemini Bain CNP Last physical: Visit date not found Last MTM visit: Visit date not found         Next appt within 3 mo: Visit date not found  Next physical within 3 mo: Visit date not found  Prescriber OR PCP: Lance Curry MD  Last diagnosis associated with med order: 1. Diabetes mellitus, type 2 (H)  - metFORMIN (GLUCOPHAGE) 500 MG tablet; Take 1 tablet (500 mg total) by mouth 2 (two) times a day with meals.  Dispense: 180 tablet; Refill: 3     If protocol passes may refill for 12 months if within 3 months of last provider visit (or a total of 15 months).           Passed - LFT or AST or ALT in last 12 months     Albumin   Date Value Ref Range Status   02/14/2020 4.2 3.5 - 5.0 g/dL Final     Bilirubin, Total   Date Value Ref Range Status   02/14/2020 0.8 0.0 - 1.0 mg/dL Final     Alkaline Phosphatase   Date Value Ref Range Status   02/14/2020 85 45 - 120 U/L Final     AST   Date Value Ref  Range Status   02/14/2020 21 0 - 40 U/L Final     ALT   Date Value Ref Range Status   02/14/2020 25 0 - 45 U/L Final     Protein, Total   Date Value Ref Range Status   02/14/2020 7.3 6.0 - 8.0 g/dL Final                Passed - GFR or Serum Creatinine in last 6 months     GFR MDRD Non Af Amer   Date Value Ref Range Status   02/14/2020 >60 >60 mL/min/1.73m2 Final     GFR MDRD Af Amer   Date Value Ref Range Status   02/14/2020 >60 >60 mL/min/1.73m2 Final             Passed - A1C in last 6 months     Hemoglobin A1c   Date Value Ref Range Status   06/19/2020 7.0 (H) 3.5 - 6.0 % Final               Passed - Microalbumin in last year      Microalbumin, Random Urine   Date Value Ref Range Status   02/14/2020 1.38 0.00 - 1.99 mg/dL Final                         none

## 2021-06-11 NOTE — TELEPHONE ENCOUNTER
RN cannot approve Refill Request    RN can NOT refill this medication Protocol failed and NO refill given. Last office visit: 7/3/2018 Lance Curry MD Last Physical: Visit date not found Last MTM visit: Visit date not found Last visit same specialty: 7/10/2019 Gemini Bain CNP.  Next visit within 3 mo: Visit date not found  Next physical within 3 mo: Visit date not found      Ashwini Gonzalez, [facility] Triage/Med Refill 10/1/2020    Requested Prescriptions   Pending Prescriptions Disp Refills     simvastatin (ZOCOR) 20 MG tablet [Pharmacy Med Name: Simvastatin 20 MG Oral Tablet] 90 tablet 0     Sig: TAKE 1 TABLET BY MOUTH AT BEDTIME       Statins Refill Protocol (Hmg CoA Reductase Inhibitors) Failed - 9/28/2020  7:55 PM        Failed - PCP or prescribing provider visit in past 12 months      Last office visit with prescriber/PCP: 7/3/2018 Lance Curry MD OR same dept: Visit date not found OR same specialty: 7/10/2019 Gemini Bain CNP  Last physical: Visit date not found Last MTM visit: Visit date not found   Next visit within 3 mo: Visit date not found  Next physical within 3 mo: Visit date not found  Prescriber OR PCP: Lance Curry MD  Last diagnosis associated with med order: 1. Hyperlipidemia  - simvastatin (ZOCOR) 20 MG tablet [Pharmacy Med Name: Simvastatin 20 MG Oral Tablet]; TAKE 1 TABLET BY MOUTH AT BEDTIME  Dispense: 90 tablet; Refill: 0    If protocol passes may refill for 12 months if within 3 months of last provider visit (or a total of 15 months).

## 2021-06-13 NOTE — TELEPHONE ENCOUNTER
11/12/20    Call Regarding Other Annual Wellness Visit    Attempt 1    Message on voicemail    Comments:       Outreach   
12/1/2020    Call Regarding Annual Wellness       Attempt 2    Message on voicemail    Comments:       Outreach   SANDOVAL  
12/4/2020     Call Regarding Annual Wellness        Attempt 3     Message on voicemail     Comments:         Outreach   GB  
n/a

## 2021-06-13 NOTE — PROGRESS NOTES
Patient ID: Ankush Zaidi is a 67 y.o. male.  /74  Pulse 78  Wt 193 lb (87.5 kg)  SpO2 98%  BMI 28.5 kg/m2    Assessment/Plan:                   Diagnoses and all orders for this visit:    Diabetes mellitus  -     Glycosylated Hemoglobin A1c  -     Microalbumin, Random Urine    Hyperlipidemia, unspecified hyperlipidemia type    Other orders  -     Influenza High Dose, Seasonal 65+ yrs      DISCUSSION  Obtain urine microalbumin.  Continue current medication therapy.  Flu shot administered today.  Needs updated tetanus shot and pneumonia vaccine prefers to hold on these updates today.  DATA:      A1C  Hemoglobin A1c (%)   Date Value   09/15/2017 6.7 (H)   01/16/2017 7.1 (H)   05/27/2016 7.1 (H)      Cholesterol:   LDL Calculated (mg/dL)   Date Value   01/16/2017 88      Blood Pressure:   BP Readings from Last 3 Encounters:   09/15/17 128/74   01/16/17 122/76   05/27/16 128/76     Urine Microalbumin  Lab Results   Component Value Date    MICROALBUR 0.85 05/27/2016       MEDICATION REGIMENT: Metformin 500 mg twice daily with meals    Eye exam: Due for eye exam eye exam is scheduled  Feet: No complaints or findings of neuropathy    Aspirin: 81 mg daily  Ace inhibitor or ARB: Not indicated no history of hypertension or microalbuminuria  Statin: 20 mg daily    Wt Readings from Last 3 Encounters:   09/15/17 193 lb (87.5 kg)   01/16/17 195 lb (88.5 kg)   05/27/16 200 lb 12.8 oz (91.1 kg)       Body mass index is 28.5 kg/(m^2).    The following high BMI interventions were performed this visit: encouragement to exercise and weight monitoring    Subjective:     HPI    Ankush Zaidi is a 67 y.o. male with a history of type 2 diabetes here for follow-up.  A1c is favorable.  Remains on metformin reports no concerns.  Overall feels well.  No neuropathy symptoms.  No chest pain or shortness of breath.  Rare acid reflux symptoms are noted.  Takes baby aspirin daily.  Does not smoke.  Due for eye exam no history of  retinopathy.  Reviewed immunizations today due for tetanus vaccination as well as Prevnar 13.  Does not wish to receive these vaccinations today.  Did agree to flu vaccination.  Needs urine microalbumin testing.  Review of Systems    Complete review of systems is obtained.  Other than the specific considerations noted above complete review of systems is negative.    Objective:   Medications:  Current Outpatient Prescriptions   Medication Sig     aspirin 81 MG EC tablet Take 81 mg by mouth daily.     cinnamon bark 500 mg capsule Take 500 mg by mouth daily.     metFORMIN (GLUCOPHAGE) 500 MG tablet Take 1 tablet (500 mg total) by mouth 2 (two) times a day with meals.     simvastatin (ZOCOR) 20 MG tablet TAKE ONE TABLET BY MOUTH ONCE DAILY AT BEDTIME     Allergies:  Allergies   Allergen Reactions     Voltaren [Diclofenac Sodium]      Blood in urine     Tobacco:   reports that he quit smoking about 48 years ago. His smoking use included Cigarettes. He has never used smokeless tobacco.     Physical Exam      /74  Pulse 78  Wt 193 lb (87.5 kg)  SpO2 98%  BMI 28.5 kg/m2    Recent Results (from the past 240 hour(s))   Glycosylated Hemoglobin A1c   Result Value Ref Range    Hemoglobin A1c 6.7 (H) 3.5 - 6.0 %         General Appearance:    Alert, cooperative, no distress   Eyes:    No conjunctival irritation, no scleral icterus       Ears:    Normal TM's and external ear canals, both ears   Lungs:     Clear to auscultation bilaterally, respirations unlabored   Heart:    Regular rate and rhythm, S1 and S2 normal, no murmur, rub   or gallop   Skin:   Skin color, texture, turgor normal, no rashes or lesions   Neurologic:   Normal strength and sensation

## 2021-06-15 NOTE — PROGRESS NOTES
Patient ID: Ankush Zaidi is a 70 y.o. male.  /76   Pulse 70   Wt 200 lb (90.7 kg)   SpO2 99%   BMI 29.53 kg/m      Assessment/Plan:                   Diagnoses and all orders for this visit:    Type 2 diabetes mellitus without complication, without long-term current use of insulin (H)  -     Comprehensive Metabolic Panel  -     Glycosylated Hemoglobin A1c  -     Microalbumin, Random Urine    Mixed hyperlipidemia  -     Comprehensive Metabolic Panel  -     Lipid Cascade      DISCUSSION  Obtain additional labs as noted.  See discussion below.  Follow-up in 6 months for recheck.  Subjective:     HPI    Ankush Zaidi is a 70 y.o. male has not been seen for an in person visit since July 2019.  He did have laboratory testing performed in June 2020.  He is here today for follow-up on chronic medical concerns including diabetes and hyperlipidemia.    He is on medications as listed.    At his last visit in 2019 he had glipizide added to his Metformin.  There was an initial attempt to increase Metformin dose but this resulted in diarrhea.  Glipizide extended release 5 mg was added in the morning.  A1c today is a little bit higher but this is attributed to diet considerations.  We discussed the importance of obtaining good control.  An A1c of 7.9 falls into an acceptable range but is not ideal for him.  Typical A1c's have been at or slightly below 7 which is ideal.  He does not report hypoglycemia.  Reports variable blood sugars with a range of 130 up to 230.  No numbers to review to discern a more specific pattern.  Discussed diabetes management extensively today.  Discussed potential referral to diabetic nurse educator but he declines.  He had numerous questions about specific dietary considerations and utilizing the time allotted we discussed things in depth as much as possible.  We will ultimately hold off on medication changes and continue to work diligently on diet.  He does not have any history of  retinopathy most recent eye exam from October 2020.  No history of chronic kidney disease or microalbuminuria.  Discussed recheck of laboratory tests.  No history of vascular disease.  Remains on appropriate medications for vascular disease risk reduction.  Blood pressure is ideal.    He is overdue for colon cancer screening we discussed this extensively.  Other routine screening and immunizations are up-to-date.    Review of Systems  Complete review of systems is obtained.  Other than the specific considerations noted above complete review of systems is negative.        Objective:   Medications:  Current Outpatient Medications   Medication Sig     aspirin 81 MG EC tablet Take 81 mg by mouth daily.     cinnamon bark 500 mg capsule Take 500 mg by mouth daily.     glipiZIDE (GLUCOTROL XL) 5 MG 24 hr tablet TAKE 1 TABLET BY MOUTH ONCE DAILY WITH BREAKFAST     metFORMIN (GLUCOPHAGE) 500 MG tablet Take 1 tablet (500 mg total) by mouth 2 (two) times a day with meals.     simvastatin (ZOCOR) 20 MG tablet Take 1 tablet (20 mg total) by mouth at bedtime.     Allergies:  Allergies   Allergen Reactions     Voltaren [Diclofenac Sodium]      Blood in urine     Tobacco:   reports that he quit smoking about 51 years ago. His smoking use included cigarettes. He has never used smokeless tobacco.     Physical Exam      /76   Pulse 70   Wt 200 lb (90.7 kg)   SpO2 99%   BMI 29.53 kg/m            General Appearance:    Alert, cooperative, no distress   Eyes:   No scleral icterus or conjunctival irritation       Ears:   He has narrow tortuous ear canals with significant cerumen on the right and minimal on the left.   Throat:   Lips, mucosa, and tongue normal; teeth and gums normal   Neck:   Supple, symmetrical, trachea midline, no adenopathy;        thyroid:  No enlargement/tenderness/nodules   Lungs:     Clear to auscultation bilaterally, respirations unlabored, no wheezes or crackles   Heart:    Regular rate and rhythm,  No  murmur   Abdomen:    Soft, no distention, no tenderness on palpation, no masses, no organomegaly     Extremities:  No edema, no joint swelling or redness, no evidence of any injuries, pulses not distinctly palpable, feet are warm with good capillary refill noted.  No ulcerations.  No significant callus formation.  No other foot deformities.   Skin:  No concerning skin findings, no suspicious moles, no rashes   Neurologic:  On gross examination there is no motor or sensory deficit.  Specific examination of the feet using the monofilament line reveals that there is no absence of sensation.  Patient walks with a normal gait

## 2021-06-17 NOTE — TELEPHONE ENCOUNTER
Patient dropped off [employer] gift card form for dr barry to fill out and mail to address listed on card.

## 2021-06-17 NOTE — TELEPHONE ENCOUNTER
Refill Approved    Rx renewed per Medication Renewal Policy. Medication was last renewed on 5/18/20, 2/8/21.    Frank Riley, [facility] Triage/Med Refill 5/25/2021     Requested Prescriptions   Pending Prescriptions Disp Refills     glipiZIDE (GLUCOTROL XL) 5 MG 24 hr tablet [Pharmacy Med Name: glipiZIDE ER 5 MG Oral Tablet Extended Release 24 Hour] 90 tablet 0     Sig: TAKE 1 TABLET BY MOUTH ONCE DAILY WITH BREAKFAST ** DUE FOR AN APPOINTMENT**       Oral Hypoglycemics Refill Protocol Passed - 5/24/2021  9:12 AM        Passed - Visit with PCP or prescribing provider visit in last 6 months       Last office visit with prescriber/PCP: 3/8/2021 OR same dept: 3/8/2021 Lance Curry MD OR same specialty: 3/8/2021 Lance Curry MD Last physical: Visit date not found Last MTM visit: Visit date not found         Next appt within 3 mo: Visit date not found  Next physical within 3 mo: Visit date not found  Prescriber OR PCP: Lance Curry MD  Last diagnosis associated with med order: 1. Diabetes mellitus, type 2 (H)  - glipiZIDE (GLUCOTROL XL) 5 MG 24 hr tablet [Pharmacy Med Name: glipiZIDE ER 5 MG Oral Tablet Extended Release 24 Hour]; TAKE 1 TABLET BY MOUTH ONCE DAILY WITH BREAKFAST ** DUE FOR AN APPOINTMENT**  Dispense: 90 tablet; Refill: 0    2. Hyperlipidemia  - simvastatin (ZOCOR) 20 MG tablet [Pharmacy Med Name: Simvastatin 20 MG Oral Tablet]; TAKE 1 TABLET BY MOUTH AT BEDTIME  Dispense: 90 tablet; Refill: 0     If protocol passes may refill for 12 months if within 3 months of last provider visit (or a total of 15 months).           Passed - A1C in last 6 months     Hemoglobin A1c   Date Value Ref Range Status   03/08/2021 7.9 (H) <=5.6 % Final               Passed - Microalbumin in last year      Microalbumin, Random Urine   Date Value Ref Range Status   03/08/2021 1.14 0.00 - 1.99 mg/dL Final                  Passed - Blood pressure in last year     BP Readings from Last 1 Encounters:   03/08/21  126/76             Passed - Serum creatinine in last year     Creatinine   Date Value Ref Range Status   03/08/2021 1.09 0.70 - 1.30 mg/dL Final                simvastatin (ZOCOR) 20 MG tablet [Pharmacy Med Name: Simvastatin 20 MG Oral Tablet] 90 tablet 0     Sig: TAKE 1 TABLET BY MOUTH AT BEDTIME       Statins Refill Protocol (Hmg CoA Reductase Inhibitors) Passed - 5/24/2021  9:12 AM        Passed - PCP or prescribing provider visit in past 12 months      Last office visit with prescriber/PCP: 3/8/2021 Lance Curry MD OR same dept: 3/8/2021 Lance Curry MD OR same specialty: 3/8/2021 Lance Curry MD  Last physical: Visit date not found Last MTM visit: Visit date not found   Next visit within 3 mo: Visit date not found  Next physical within 3 mo: Visit date not found  Prescriber OR PCP: Lance Curry MD  Last diagnosis associated with med order: 1. Diabetes mellitus, type 2 (H)  - glipiZIDE (GLUCOTROL XL) 5 MG 24 hr tablet [Pharmacy Med Name: glipiZIDE ER 5 MG Oral Tablet Extended Release 24 Hour]; TAKE 1 TABLET BY MOUTH ONCE DAILY WITH BREAKFAST ** DUE FOR AN APPOINTMENT**  Dispense: 90 tablet; Refill: 0    2. Hyperlipidemia  - simvastatin (ZOCOR) 20 MG tablet [Pharmacy Med Name: Simvastatin 20 MG Oral Tablet]; TAKE 1 TABLET BY MOUTH AT BEDTIME  Dispense: 90 tablet; Refill: 0    If protocol passes may refill for 12 months if within 3 months of last provider visit (or a total of 15 months).

## 2021-06-19 NOTE — LETTER
Letter by Gemini Bain CNP at      Author: Gemini Bain CNP Service: -- Author Type: --    Filed:  Encounter Date: 7/16/2019 Status: (Other)         Ankush Zaidi  [address] [place] [address]             July 16, 2019         Dear Mr. Zaidi,    Below are the results from your recent visit:    Resulted Orders   Glycosylated Hemoglobin A1c   Result Value Ref Range    Hemoglobin A1c 8.1 (H) 3.5 - 6.0 %   Lipid Cascade FASTING   Result Value Ref Range    Cholesterol 145 <=199 mg/dL    Triglycerides 77 <=149 mg/dL    HDL Cholesterol 41 >=40 mg/dL    LDL Calculated 89 <=129 mg/dL    Patient Fasting > 8hrs? Unknown        Hemoglobin A1c has increased.  As discussed, increase metformin to 500 mg, 2 tablets by mouth twice daily.  Follow-up with diabetes education in the near future and schedule follow-up visit in clinic in 3 months to recheck A1c.  Cholesterol looks great!    Please call with questions or contact us using [employer].    Sincerely,        Electronically signed by Gemini Bain CNP

## 2021-06-19 NOTE — PROGRESS NOTES
Patient ID: Ankush Zaidi is a 68 y.o. male.  /76  Pulse 75  Wt 196 lb (88.9 kg)  SpO2 98%  BMI 28.94 kg/m2    Assessment/Plan:                Diagnoses and all orders for this visit:    Diabetes mellitus (H)  -     Glycosylated Hemoglobin A1c    Screen for colon cancer    Other orders  -     Cancel: Td, Preservative Free (green label)  -     Cancel: Ambulatory referral for Colonoscopy  -     Cancel: Microalbumin, Random Urine  -     Cancel: Pneumococcal conjugate vaccine 13-valent 6wks-17yrs; >50yrs      DISCUSSION  Continue current medications check cholesterol, since he is not fasting we will do direct LDL.  No indication for any further testing at this time.  Recommend follow-up in 6 months to reassess at which time we will push for further health maintenance issues.  Subjective:     HPI    Ankush Zaidi is a 68 y.o. male who is here today to follow-up on type 2 diabetes.  He is on metformin.  Takes simvastatin.  No history of hypertension or microalbuminuria.  Recent eye exam in September 2017 no retinopathy.  Denies neuropathy symptoms.  Historically normal foot exams.  Patient is due for routine health maintenance wishes to put off at this time.  Denies chest pain shortness of breath.  Overall states he feels well.  Remains active.    Review of Systems  Complete review of systems is obtained.  Other than the specific considerations noted above complete review of systems is negative.        Objective:   Medications:  Current Outpatient Prescriptions   Medication Sig     aspirin 81 MG EC tablet Take 81 mg by mouth daily.     cinnamon bark 500 mg capsule Take 500 mg by mouth daily.     metFORMIN (GLUCOPHAGE) 500 MG tablet Take 1 tablet (500 mg total) by mouth 2 (two) times a day with meals.     simvastatin (ZOCOR) 20 MG tablet Take 1 tablet (20 mg total) by mouth at bedtime.     Allergies:  Allergies   Allergen Reactions     Voltaren [Diclofenac Sodium]      Blood in urine     Tobacco:   reports  that he quit smoking about 48 years ago. His smoking use included Cigarettes. He has never used smokeless tobacco.     Physical Exam      /76  Pulse 75  Wt 196 lb (88.9 kg)  SpO2 98%  BMI 28.94 kg/m2        General Appearance:    Alert, cooperative, no distress   Eyes:    No conjunctival irritation, no scleral icterus       Lungs:     Clear to auscultation bilaterally, respirations unlabored   Heart:    Regular rate and rhythm, S1 and S2 normal, no murmur, rub   or gallop   Extremities:   Extremities normal, atraumatic, no cyanosis or edema   Skin:   Skin color, texture, turgor normal, no rashes or lesions   Neurologic:   Normal strength and sensation

## 2021-06-19 NOTE — LETTER
Letter by Gemini Bain CNP at      Author: Gemini Bain CNP Service: -- Author Type: --    Filed:  Encounter Date: 7/31/2019 Status: (Other)        [facility] MEDICINE/OB  [address]  [facility] [address]  [phone]         Ankush Zaidi  [address] [address]        07/31/19    Dear Ankush Zaidi,     At [facility] we care about your health and well-being. Your primary care provider is committed to ensuring you receive high quality care and has chosen a network of specialists to assist in providing that care. Recently Gemini Bain CNP referred you to [facility] for specialty care.      Please call [facility] ([phone]) at your earliest convenience for assistance in scheduling an appointment.  If you have already scheduled this appointment, please disregard this notice.  Thank you for choosing [facility] for your healthcare needs.       Sincerely,     Gemini Bain CNP /   [facility] Specialty Scheduling

## 2021-06-20 NOTE — LETTER
Letter by Lance Curry MD at      Author: Lance Curry MD Service: -- Author Type: --    Filed:  Encounter Date: 2/16/2020 Status: (Other)         Ankush Zaidi  [address] [place] [address]             February 16, 2020         Dear Mr. Zaidi,    Below are the results from your recent visit:    Resulted Orders   Comprehensive Metabolic Panel   Result Value Ref Range    Sodium 138 136 - 145 mmol/L    Potassium 4.2 3.5 - 5.0 mmol/L    Chloride 102 98 - 107 mmol/L    CO2 27 22 - 31 mmol/L    Anion Gap, Calculation 9 5 - 18 mmol/L    Glucose 182 (H) 70 - 125 mg/dL    BUN 16 8 - 22 mg/dL    Creatinine 0.97 0.70 - 1.30 mg/dL    GFR MDRD Af Amer >60 >60 mL/min/1.73m2    GFR MDRD Non Af Amer >60 >60 mL/min/1.73m2    Bilirubin, Total 0.8 0.0 - 1.0 mg/dL    Calcium 9.4 8.5 - 10.5 mg/dL    Protein, Total 7.3 6.0 - 8.0 g/dL    Albumin 4.2 3.5 - 5.0 g/dL    Alkaline Phosphatase 85 45 - 120 U/L    AST 21 0 - 40 U/L    ALT 25 0 - 45 U/L    Narrative    Fasting Glucose reference range is 70-99 mg/dL per  American Diabetes Association (ADA) guidelines.   Lipid [place]rell FASTING   Result Value Ref Range    Cholesterol 146 <=199 mg/dL    Triglycerides 123 <=149 mg/dL    HDL Cholesterol 37 (L) >=40 mg/dL    LDL Calculated 84 <=129 mg/dL    Patient Fasting > 8hrs? Yes    Glycosylated Hemoglobin A1c   Result Value Ref Range    Hemoglobin A1c 7.2 (H) 3.5 - 6.0 %     Overall there is no major concern with these recent lab test results.  The blood sugar at the time of your test was 182 which is higher than ideal.    The electrolytes, kidney function and liver function measurements are all normal.    The cholesterol numbers overall are good, the HDL cholesterol which is often referred to his good cholesterol is a little bit lower than ideal.  Continued efforts to improve your blood sugar will also improve this component of the cholesterol.    The hemoglobin A1c is in a favorable range.  It has improved.  Keep up  the good work with your blood sugar.  Please return for an office visit in no more than 6 months.    Please call with questions or contact us using [employer].    Sincerely,        Electronically signed by Lance Curry MD

## 2021-06-20 NOTE — LETTER
Letter by Gemini Bain CNP at      Author: Gemini Bain CNP Service: -- Author Type: --    Filed:  Encounter Date: 2/14/2020 Status: (Other)         Ankush Zaidi  [address] [place] [address]             February 14, 2020         Dear Mr. Zaidi,    Below are the results from your recent visit:    Resulted Orders   Microalbumin, Random Urine   Result Value Ref Range    Microalbumin, Random Urine 1.38 0.00 - 1.99 mg/dL    Creatinine, Urine 154.4 mg/dL    Microalbumin/Creatinine Ratio Random Urine 8.9 <=19.9 mg/g    Narrative    Microalbumin, Random Urine  <2.0 mg/dL . . . . . . . . Normal  3.0-30.0 mg/dL . . . . . . Microalbuminuria  >30.0 mg/dL . . . . . .  . Clinical Proteinuria    Microalbumin/Creatinine Ratio, Random Urine  <20 mg/g . . . . .. . . . Normal   mg/g . . . . . . . Microalbuminuria  >300 mg/g . . . . . . . . Clinical Proteinuria           Your urine microablumin level is normal. This helps us measure your kidney function. We will repeat in a year.    Please call with questions or contact us using [employer].    Sincerely,        Electronically signed by Gemini Bain CNP

## 2021-06-21 NOTE — LETTER
Letter by Lance Curry MD at      Author: Lance Curry MD Service: -- Author Type: --    Filed:  Encounter Date: 3/11/2021 Status: (Other)         Ankush Zaidi  [address] [place] [address]             March 11, 2021         Dear Mr. Zaidi,    Below are the results from your recent visit:    Resulted Orders   Comprehensive Metabolic Panel   Result Value Ref Range    Sodium 142 136 - 145 mmol/L    Potassium 4.3 3.5 - 5.0 mmol/L    Chloride 107 98 - 107 mmol/L    CO2 25 22 - 31 mmol/L    Anion Gap, Calculation 10 5 - 18 mmol/L    Glucose 170 (H) 70 - 125 mg/dL    BUN 17 8 - 28 mg/dL    Creatinine 1.09 0.70 - 1.30 mg/dL    GFR MDRD Af Amer >60 >60 mL/min/1.73m2    GFR MDRD Non Af Amer >60 >60 mL/min/1.73m2    Bilirubin, Total 0.5 0.0 - 1.0 mg/dL    Calcium 8.7 8.5 - 10.5 mg/dL    Protein, Total 7.4 6.0 - 8.0 g/dL    Albumin 4.3 3.5 - 5.0 g/dL    Alkaline Phosphatase 95 45 - 120 U/L    AST 19 0 - 40 U/L    ALT 26 0 - 45 U/L    Narrative    Fasting Glucose reference range is 70-99 mg/dL per  American Diabetes Association (ADA) guidelines.   Glycosylated Hemoglobin A1c   Result Value Ref Range    Hemoglobin A1c 7.9 (H) <=5.6 %   Microalbumin, Random Urine   Result Value Ref Range    Microalbumin, Random Urine 1.14 0.00 - 1.99 mg/dL    Creatinine, Urine 172.2 mg/dL    Microalbumin/Creatinine Ratio Random Urine 6.6 <=19.9 mg/g    Narrative    Microalbumin, Random Urine  <2.0 mg/dL . . . . . . . . Normal  3.0-30.0 mg/dL . . . . . . Microalbuminuria  >30.0 mg/dL . . . . . .  . Clinical Proteinuria    Microalbumin/Creatinine Ratio, Random Urine  <20 mg/g . . . . .. . . . Normal   mg/g . . . . . . . Microalbuminuria  >300 mg/g . . . . . . . . Clinical Proteinuria       Lipid Cascade   Result Value Ref Range    Cholesterol 133 <=199 mg/dL    Triglycerides 113 <=149 mg/dL    HDL Cholesterol 39 (L) >=40 mg/dL    LDL Calculated 71 <=129 mg/dL       Overall the additional labs look good.  We  just need to make sure the blood sugar does not continue to get higher as we discussed.  Cholesterol numbers look good.  The electrolytes, kidney function and liver function are normal.  The urine test was normal.  Follow-up as we discussed    Please call with questions or contact us using [employer].    Sincerely,        Electronically signed by Lance Curry MD

## 2021-07-03 NOTE — ADDENDUM NOTE
Addendum Note by Qing Campos MLT at 7/10/2019  9:20 AM     Author: Qing Campos MLT Service: -- Author Type:     Filed: 7/10/2019  1:52 PM Encounter Date: 7/10/2019 Status: Signed    : Qing Campos MLT ()    Addended by: QING CAMPOS on: 7/10/2019 01:52 PM        Modules accepted: Orders

## 2021-07-04 NOTE — ADDENDUM NOTE
Addendum Note by Urvashi Singh at 3/8/2021  9:00 AM     Author: Urvashi Singh Service: -- Author Type:     Filed: 3/8/2021 12:34 PM Encounter Date: 3/8/2021 Status: Signed    : Urvashi Singh ()    Addended by: URVASHI SINGH A on: 3/8/2021 12:34 PM        Modules accepted: Orders

## 2021-08-16 DIAGNOSIS — E78.5 HYPERLIPIDEMIA: ICD-10-CM

## 2021-08-16 DIAGNOSIS — E11.69 TYPE 2 DIABETES MELLITUS WITH OTHER SPECIFIED COMPLICATION, WITHOUT LONG-TERM CURRENT USE OF INSULIN (H): ICD-10-CM

## 2021-08-16 NOTE — TELEPHONE ENCOUNTER
Patient has about 3 days left of his metformin so he is needing a refill. Please refill and send to pharmacy if appropriate.

## 2021-09-07 ENCOUNTER — TELEPHONE (OUTPATIENT)
Dept: LAB | Facility: CLINIC | Age: 71
End: 2021-09-07

## 2021-09-07 ENCOUNTER — TELEPHONE (OUTPATIENT)
Dept: FAMILY MEDICINE | Facility: CLINIC | Age: 71
End: 2021-09-07

## 2021-09-07 DIAGNOSIS — E11.69 TYPE 2 DIABETES MELLITUS WITH OTHER SPECIFIED COMPLICATION, WITHOUT LONG-TERM CURRENT USE OF INSULIN (H): Primary | ICD-10-CM

## 2021-09-07 NOTE — TELEPHONE ENCOUNTER
Has a lab appoinment  on 9/9/21 for his 6 month dm check.  A1c order placed and linked to dm type 2. Can you review and add any other orders that you would like done. Thank you

## 2021-09-09 ENCOUNTER — LAB (OUTPATIENT)
Dept: LAB | Facility: CLINIC | Age: 71
End: 2021-09-09
Payer: COMMERCIAL

## 2021-09-09 DIAGNOSIS — E11.69 TYPE 2 DIABETES MELLITUS WITH OTHER SPECIFIED COMPLICATION, WITHOUT LONG-TERM CURRENT USE OF INSULIN (H): ICD-10-CM

## 2021-09-09 LAB — HBA1C MFR BLD: 6.6 % (ref 0–5.6)

## 2021-09-09 PROCEDURE — 83036 HEMOGLOBIN GLYCOSYLATED A1C: CPT

## 2021-09-09 PROCEDURE — 36415 COLL VENOUS BLD VENIPUNCTURE: CPT

## 2021-09-09 NOTE — LETTER
September 13, 2021      Ankush Zaidi  [address] [facility] [address]        Dear ,    We are writing to inform you of your test results.  The A1c improved to 6.6.    Continue current treatment course.    Recommend a follow-up office visit in 3 months.       Resulted Orders   Hemoglobin A1c   Result Value Ref Range    Hemoglobin A1C 6.6 (H) 0.0 - 5.6 %      Comment:      Normal <5.7%   Prediabetes 5.7-6.4%    Diabetes 6.5% or higher     Note: Adopted from ADA consensus guidelines.       If you have any questions or concerns, please call the clinic at the number listed above.       Sincerely,      Lance Curry MD

## 2021-09-13 ENCOUNTER — TELEPHONE (OUTPATIENT)
Dept: FAMILY MEDICINE | Facility: CLINIC | Age: 71
End: 2021-09-13

## 2021-09-13 NOTE — TELEPHONE ENCOUNTER
----- Message from Lance Curry MD sent at 9/9/2021  4:34 PM CDT -----  Please inform patient that the A1c improved to 6.6.  Continue current treatment course.  Follow-up in 3 months.

## 2021-10-04 ENCOUNTER — TELEPHONE (OUTPATIENT)
Dept: FAMILY MEDICINE | Facility: CLINIC | Age: 71
End: 2021-10-04

## 2021-10-04 DIAGNOSIS — Z12.11 COLON CANCER SCREENING: Primary | ICD-10-CM

## 2021-10-04 NOTE — TELEPHONE ENCOUNTER
Reason for Call: Request for an order or referral:    Order or referral being requested: Colonoscopy, screening    Date needed: as soon as possible    Has the patient been seen by the PCP for this problem? Not Applicable    Additional comments: Recvd call from toño/Ankush he is requesting a screening colonoscopy w/[facility]. Pls place an order for this    Phone number Patient can be reached at:  Home number on file [phone]    Best Time:  anytime    Can we leave a detailed message on this number?  YES    Call taken on 10/4/2021 at 11:46 AM by Eugenia Nguyen

## 2021-11-01 ENCOUNTER — TRANSFERRED RECORDS (OUTPATIENT)
Dept: HEALTH INFORMATION MANAGEMENT | Facility: CLINIC | Age: 71
End: 2021-11-01
Payer: COMMERCIAL

## 2021-11-01 LAB — RETINOPATHY: NEGATIVE

## 2021-12-03 ENCOUNTER — OFFICE VISIT (OUTPATIENT)
Dept: FAMILY MEDICINE | Facility: CLINIC | Age: 71
End: 2021-12-03
Payer: COMMERCIAL

## 2021-12-03 VITALS
OXYGEN SATURATION: 96 % | SYSTOLIC BLOOD PRESSURE: 149 MMHG | HEART RATE: 98 BPM | DIASTOLIC BLOOD PRESSURE: 104 MMHG | TEMPERATURE: 96.7 F

## 2021-12-03 DIAGNOSIS — B02.9 HERPES ZOSTER WITHOUT COMPLICATION: Primary | ICD-10-CM

## 2021-12-03 PROCEDURE — 99213 OFFICE O/P EST LOW 20 MIN: CPT | Performed by: FAMILY MEDICINE

## 2021-12-03 RX ORDER — VALACYCLOVIR HYDROCHLORIDE 1 G/1
1000 TABLET, FILM COATED ORAL 3 TIMES DAILY
Qty: 21 TABLET | Refills: 0 | Status: SHIPPED | OUTPATIENT
Start: 2021-12-03 | End: 2022-01-31

## 2021-12-04 NOTE — PROGRESS NOTES
Assessment:       Herpes zoster without complication    - valACYclovir (VALTREX) 1000 mg tablet  Dispense: 21 tablet; Refill: 0         Plan:   Symptoms consistent with herpes zoster without complications.  Prescription given for valacyclovir 1000 mg 3 times daily for 7 days.  Take Tylenol as needed for discomfort.  Discussed the typical course of symptoms.  Recommend follow-up with his PCP if symptoms are getting worse or pain is not controlled.      MEDICATIONS:   Orders Placed This Encounter   Medications     valACYclovir (VALTREX) 1000 mg tablet     Sig: Take 1 tablet (1,000 mg) by mouth 3 times daily for 7 days     Dispense:  21 tablet     Refill:  0         Subjective:       71 year old male presents for evaluation of a 1 day history of the vesicular rash that he noted on the left side of his abdomen extending around to the left back, preceded by a tingling burning discomfort in the area of the rash for the previous 2 days or so.  He has overall been feeling fatigued but has not had a fever and otherwise feels okay.  Discomfort is mild.  He has not been vaccinated against shingles.    Patient Active Problem List   Diagnosis     Stem cell donor       No past medical history on file.    No past surgical history on file.    Current Outpatient Medications   Medication     aspirin 81 MG EC tablet     cinnamon bark 500 mg capsule     glipiZIDE (GLUCOTROL XL) 5 MG 24 hr tablet     metFORMIN (GLUCOPHAGE) 500 MG tablet     simvastatin (ZOCOR) 20 MG tablet     valACYclovir (VALTREX) 1000 mg tablet     No current facility-administered medications for this visit.       Allergies   Allergen Reactions     Voltaren [Diclofenac] Unknown     Blood in urine       No family history on file.    Social History     Socioeconomic History     Marital status:      Spouse name: Not on file     Number of children: Not on file     Years of education: Not on file     Highest education level: Not on file   Occupational History      Not on file   Tobacco Use     Smoking status: Former Smoker     Types: Cigarettes     Quit date: 1969     Years since quittin.2     Smokeless tobacco: Never Used   Substance and Sexual Activity     Alcohol use: Yes     Alcohol/week: 3.3 standard drinks     Drug use: No     Sexual activity: Not on file   Other Topics Concern     Not on file   Social History Narrative     Not on file     Social Determinants of Health     Financial Resource Strain: Not on file   Food Insecurity: Not on file   Transportation Needs: Not on file   Physical Activity: Not on file   Stress: Not on file   Social Connections: Not on file   Intimate Partner Violence: Not on file   Housing Stability: Not on file         Review of Systems  Pertinent items are noted in HPI.      Objective:     BP (!) 149/104 (BP Location: Right arm, Patient Position: Chair, Cuff Size: Adult Regular)   Pulse 98   Temp (!) 96.7  F (35.9  C) (Tympanic)   SpO2 96%      General appearance: alert, appears stated age and cooperative  Skin: Patient noted to have a vesicular rash on erythematous base extending on his left abdomen around to the left side of his back not crossing the midline.    This note has been dictated using voice recognition software. Any grammatical or context distortions are unintentional and inherent to the software

## 2021-12-10 ENCOUNTER — OFFICE VISIT (OUTPATIENT)
Dept: FAMILY MEDICINE | Facility: CLINIC | Age: 71
End: 2021-12-10
Payer: COMMERCIAL

## 2021-12-10 VITALS
HEART RATE: 109 BPM | WEIGHT: 189.3 LBS | SYSTOLIC BLOOD PRESSURE: 118 MMHG | DIASTOLIC BLOOD PRESSURE: 70 MMHG | OXYGEN SATURATION: 98 % | BODY MASS INDEX: 27.95 KG/M2

## 2021-12-10 DIAGNOSIS — B02.29 POST HERPETIC NEURALGIA: Primary | ICD-10-CM

## 2021-12-10 PROBLEM — E11.9 DIABETES MELLITUS, TYPE 2 (H): Status: ACTIVE | Noted: 2021-12-10

## 2021-12-10 PROCEDURE — 99213 OFFICE O/P EST LOW 20 MIN: CPT | Performed by: FAMILY MEDICINE

## 2021-12-10 RX ORDER — GABAPENTIN 300 MG/1
300 CAPSULE ORAL 3 TIMES DAILY
Qty: 90 CAPSULE | Refills: 1 | Status: SHIPPED | OUTPATIENT
Start: 2021-12-10 | End: 2022-01-07

## 2021-12-10 NOTE — PROGRESS NOTES
Assessment & Plan     Post herpetic neuralgia  Provided reassurance that the shingles rash is resolving as expected.  He should complete the full course of valacyclovir.  He is experiencing postherpetic neuralgia.  Recommend trial of ice and topical lidocaine.  Okay to use NSAIDs.  We will also start gabapentin for nerve pain.  Counseled on use of medication and side effects.  Directed him to start gabapentin 300 mg at bedtime and then increase to twice daily after 3 days and then increase to 3 times daily after 3 days.  Follow-up with PCP if pain is not well controlled with these measures or if new concerns develop.  - gabapentin (NEURONTIN) 300 MG capsule  Dispense: 90 capsule; Refill: 1                   No follow-ups on file.    Jody Rodriguez MD  [facility] SERGEY Lechuga is a 71 year old who presents for the following health issues     HPI   He comes in today to follow-up on shingles.  Noticed presence of a rash last Friday after taking a shower.  Seen at walk-in clinic and diagnosed with shingles.  Was prescribed valacyclovir 1000 mg 3 times daily for 1 week.  Comes in today because he reports no significant improvement in symptoms.  He has no prior history of shingles and really is not sure what to expect.  He has constant pain.  Describes sensation of muscle tightness and spasming also burning and tingling.  He has tried taking acetaminophen.  Has not tried any other treatments.  The skin is very sensitive to the touch.  He is having difficulty sleeping.  Unable to tolerate any significant pressure on the area.  He has been eating and drinking okay.  Review of systems is otherwise negative.        Review of Systems         Objective    /70 (BP Location: Left arm, Cuff Size: Adult Regular)   Pulse 109   Wt 85.9 kg (189 lb 4.8 oz)   SpO2 98%   BMI 27.95 kg/m    Body mass index is 27.95 kg/m .  Physical Exam   GENERAL: healthy, alert and no distress  Skin: Resolving  shingles rash present left mid back extending around left flank to left mid abdomen

## 2021-12-10 NOTE — PATIENT INSTRUCTIONS
Try topical lidocaine cream or gel    You can use ice    Try gabapentin for nerve pain--Start 1 capsule at bedtime for 3 days and then increase to twice daily for 3 days and then increase to 3 times daily      You can take ibuprofen or aleve      Patient Education     Shingles  Shingles is a viral infection caused by the same virus that causes chicken pox. Anyone who has had chicken pox may get shingles later in life. The virus stays in the body, but remains asleep (dormant). Shingles often occurs in older persons or persons with lowered immunity. But it can affect anyone at any age.  Shingles starts as a tingling patch of skin on one side of the body. Small, painful blisters may then appear. The rash rarely spreads to other parts of the body.  Exposure to shingles can't cause shingles. However, it can cause chicken pox in anyone who has not had chicken pox or has not been vaccinated. The contagious period ends when all blisters have crusted over, generally 1 to 2 weeks after the illness starts.  After the blisters heal, the affected skin may be sensitive or painful for weeks or months, gradually resolving over time. But, sometimes this can last longer and be permanent (called postherpetic neuralgia.)  Shingles vaccines are available. Vaccination can help prevent shingles or make it less painful. It is generally recommended for adults older than 50, even if you've had singles in the past. Talk with your healthcare provider about when to get vaccinated and which vaccine is best for you.  Home care    Medicines may be prescribed to help relieve pain. Take these medicines as directed. Ask your healthcare provider or pharmacist before using over-the-counter medicines for helping treat pain and itching.    In certain cases, antiviral medicines may be prescribed to reduce pain, shorten the illness, and prevent neuralgia. Take these medicines as directed.    Compresses made from a solution of cool water mixed with  cornstarch or baking soda may help relieve pain and itching.     Gently wash skin daily with soap and water to help prevent infection. Be certain to rinse off all of the soap, which can be irritating.    Trim fingernails and try not to scratch. Scratching the sores may leave scars.    Stay home from work or school until all blisters have formed a crust and you are no longer contagious.  Follow-up care  Follow up with your healthcare provider, or as directed.  When to seek medical advice    Fever of 100.4 F (38 C) or higher, or as directed by your healthcare provider    Affected skin is on the face or neck and any of the following occur:  ? Headache  ? Eye pain  ? Changes in vision  ? Sores near the eye  ? Weakness of facial muscles    Blisters occurring on new areas of the body    Pain, redness, or swelling of a joint    Signs of skin infection: colored drainage from the sores, warmth, increasing redness, fever, or increasing pain  Nadia last reviewed this educational content on 4/1/2018 2000-2021 [employer]. All rights reserved. This information is not intended as a substitute for professional medical care. Always follow your healthcare professional's instructions.

## 2022-01-07 ENCOUNTER — OFFICE VISIT (OUTPATIENT)
Dept: FAMILY MEDICINE | Facility: CLINIC | Age: 72
End: 2022-01-07
Payer: COMMERCIAL

## 2022-01-07 VITALS
HEART RATE: 76 BPM | SYSTOLIC BLOOD PRESSURE: 112 MMHG | BODY MASS INDEX: 28.28 KG/M2 | WEIGHT: 191.5 LBS | DIASTOLIC BLOOD PRESSURE: 68 MMHG | OXYGEN SATURATION: 98 %

## 2022-01-07 DIAGNOSIS — B02.29 POST HERPETIC NEURALGIA: Primary | ICD-10-CM

## 2022-01-07 PROCEDURE — 99213 OFFICE O/P EST LOW 20 MIN: CPT | Mod: 25 | Performed by: FAMILY MEDICINE

## 2022-01-07 PROCEDURE — 90662 IIV NO PRSV INCREASED AG IM: CPT | Performed by: FAMILY MEDICINE

## 2022-01-07 PROCEDURE — G0008 ADMIN INFLUENZA VIRUS VAC: HCPCS | Performed by: FAMILY MEDICINE

## 2022-01-07 NOTE — PROGRESS NOTES
Assessment & Plan     Post herpetic neuralgia  Symptoms are significantly improved.  We will have him taper off of gabapentin.  Discussed reducing dosage to 300 mg twice daily for 3 days and then taking 300 mg daily for 4 days and then discontinuing the medication altogether.  Follow-up with PCP as needed.  Encourage shingles vaccine at some point in future.                   No follow-ups on file.    Jody Rodriguez MD  [facility] SERGEY Lechuga is a 71 year old who presents for the following health issues     HPI   He comes in today to follow-up on shingles and postherpetic neuralgia.  He was seen in clinic on 12/10/2021 for shingles.  He had initially been seen at urgent care and had been prescribed valacyclovir.  He was experiencing a lot of muscle tightness, spasming and burning and tingling of the skin.  He was started on gabapentin 300 mg daily with instructions to gradually increase to 300 mg 3 times daily over the course of a week.  He reports that he is doing much better.  Really does not have any discomfort.  The shingles rash has resolved.  Still has a couple of spots of discoloration.  He has questions about whether he needs to continue the gabapentin or how he is to stop taking the medication.  Does not want to stop taking it abruptly.  He reports that the gabapentin is affecting his bowel movements and he does not want to continue on it for longer than necessary.  He also would like an influenza vaccine today.  He has no other concerns.  Medications and allergies are reviewed and updated.        Review of Systems         Objective    /68 (BP Location: Left arm, Patient Position: Sitting, Cuff Size: Adult Regular)   Pulse 76   Wt 86.9 kg (191 lb 8 oz)   SpO2 98%   BMI 28.28 kg/m    Body mass index is 28.28 kg/m .  Physical Exam   GENERAL: healthy, alert and no distress  SKIN: no suspicious lesions or rashes and there are a few spots of postinflammatory  hyperpigmentation on his left mid back area

## 2022-01-07 NOTE — PATIENT INSTRUCTIONS
Reduce gabapentin to 1 tablet twice daily for 3 days then take 1 tablet daily for the next 4 days    Flu shot today

## 2022-01-31 ENCOUNTER — OFFICE VISIT (OUTPATIENT)
Dept: FAMILY MEDICINE | Facility: CLINIC | Age: 72
End: 2022-01-31
Payer: COMMERCIAL

## 2022-01-31 VITALS
WEIGHT: 191.3 LBS | OXYGEN SATURATION: 98 % | DIASTOLIC BLOOD PRESSURE: 76 MMHG | HEART RATE: 77 BPM | BODY MASS INDEX: 28.25 KG/M2 | SYSTOLIC BLOOD PRESSURE: 128 MMHG

## 2022-01-31 DIAGNOSIS — E11.69 TYPE 2 DIABETES MELLITUS WITH OTHER SPECIFIED COMPLICATION, WITHOUT LONG-TERM CURRENT USE OF INSULIN (H): Primary | ICD-10-CM

## 2022-01-31 DIAGNOSIS — E11.9 TYPE 2 DIABETES MELLITUS WITHOUT COMPLICATION, WITHOUT LONG-TERM CURRENT USE OF INSULIN (H): ICD-10-CM

## 2022-01-31 DIAGNOSIS — B02.29 POST HERPETIC NEURALGIA: ICD-10-CM

## 2022-01-31 DIAGNOSIS — G89.29 CHRONIC RIGHT-SIDED LOW BACK PAIN WITH RIGHT-SIDED SCIATICA: ICD-10-CM

## 2022-01-31 DIAGNOSIS — M54.41 CHRONIC RIGHT-SIDED LOW BACK PAIN WITH RIGHT-SIDED SCIATICA: ICD-10-CM

## 2022-01-31 LAB
ALBUMIN SERPL-MCNC: 3.8 G/DL (ref 3.5–5)
ALP SERPL-CCNC: 87 U/L (ref 45–120)
ALT SERPL W P-5'-P-CCNC: 23 U/L (ref 0–45)
ANION GAP SERPL CALCULATED.3IONS-SCNC: 10 MMOL/L (ref 5–18)
AST SERPL W P-5'-P-CCNC: 16 U/L (ref 0–40)
BILIRUB SERPL-MCNC: 0.6 MG/DL (ref 0–1)
BUN SERPL-MCNC: 17 MG/DL (ref 8–28)
CALCIUM SERPL-MCNC: 9.2 MG/DL (ref 8.5–10.5)
CHLORIDE BLD-SCNC: 104 MMOL/L (ref 98–107)
CO2 SERPL-SCNC: 24 MMOL/L (ref 22–31)
CREAT SERPL-MCNC: 0.95 MG/DL (ref 0.7–1.3)
GFR SERPL CREATININE-BSD FRML MDRD: 86 ML/MIN/1.73M2
GLUCOSE BLD-MCNC: 251 MG/DL (ref 70–125)
HBA1C MFR BLD: 6.7 % (ref 0–5.6)
HOLD SPECIMEN: NORMAL
POTASSIUM BLD-SCNC: 4.3 MMOL/L (ref 3.5–5)
PROT SERPL-MCNC: 6.9 G/DL (ref 6–8)
SODIUM SERPL-SCNC: 138 MMOL/L (ref 136–145)

## 2022-01-31 PROCEDURE — 83036 HEMOGLOBIN GLYCOSYLATED A1C: CPT | Performed by: FAMILY MEDICINE

## 2022-01-31 PROCEDURE — 80053 COMPREHEN METABOLIC PANEL: CPT | Performed by: FAMILY MEDICINE

## 2022-01-31 PROCEDURE — 36415 COLL VENOUS BLD VENIPUNCTURE: CPT | Performed by: FAMILY MEDICINE

## 2022-01-31 PROCEDURE — 99214 OFFICE O/P EST MOD 30 MIN: CPT | Performed by: FAMILY MEDICINE

## 2022-01-31 RX ORDER — GABAPENTIN 300 MG/1
CAPSULE ORAL
COMMUNITY
Start: 2022-01-14 | End: 2022-01-31

## 2022-01-31 RX ORDER — GABAPENTIN 300 MG/1
600 CAPSULE ORAL 3 TIMES DAILY
Qty: 270 CAPSULE | Refills: 0 | Status: SHIPPED | OUTPATIENT
Start: 2022-01-31 | End: 2022-04-05

## 2022-01-31 NOTE — LETTER
February 1, 2022      Ankush Zaidi  [address]  [facility] [address]        Dear ,    We are writing to inform you of your test results.      The electrolytes, kidney function and liver function tests are normal.  The blood sugar at the time of your visit was 251 which is pretty high.  Continue to work hard on a healthy balanced diet that is low in sugar and carbohydrates.    Resulted Orders   Hemoglobin A1c   Result Value Ref Range    Hemoglobin A1C 6.7 (H) 0.0 - 5.6 %      Comment:      Normal <5.7%   Prediabetes 5.7-6.4%    Diabetes 6.5% or higher     Note: Adopted from ADA consensus guidelines.   Comprehensive metabolic panel   Result Value Ref Range    Sodium 138 136 - 145 mmol/L    Potassium 4.3 3.5 - 5.0 mmol/L    Chloride 104 98 - 107 mmol/L    Carbon Dioxide (CO2) 24 22 - 31 mmol/L    Anion Gap 10 5 - 18 mmol/L    Urea Nitrogen 17 8 - 28 mg/dL    Creatinine 0.95 0.70 - 1.30 mg/dL    Calcium 9.2 8.5 - 10.5 mg/dL    Glucose 251 (H) 70 - 125 mg/dL    Alkaline Phosphatase 87 45 - 120 U/L    AST 16 0 - 40 U/L    ALT 23 0 - 45 U/L    Protein Total 6.9 6.0 - 8.0 g/dL    Albumin 3.8 3.5 - 5.0 g/dL    Bilirubin Total 0.6 0.0 - 1.0 mg/dL    GFR Estimate 86 >60 mL/min/1.73m2      Comment:      Effective December 21, 2021 eGFRcr in adults is calculated using the 2021 CKD-EPI creatinine equation which includes age and gender ( et al., NEJM, DOI: 10.1056/HHXGhc7513728)       If you have any questions or concerns, please call the clinic at the number listed above.       Sincerely,      Lance Curry MD

## 2022-01-31 NOTE — PROGRESS NOTES
Ankush Zaidi  /76   Pulse 77   Wt 86.8 kg (191 lb 4.8 oz)   SpO2 98%   BMI 28.25 kg/m       Assessment/Plan:                Ankush was seen today for back pain and diabetes.    Diagnoses and all orders for this visit:    Type 2 diabetes mellitus with other specified complication, without long-term current use of insulin (H)  -     Hemoglobin A1c  -     Comprehensive metabolic panel; Future  -     Comprehensive metabolic panel    Type 2 diabetes mellitus without complication, without long-term current use of insulin (H)    Chronic right-sided low back pain with right-sided sciatica  -     gabapentin (NEURONTIN) 300 MG capsule; Take 2 capsules (600 mg) by mouth 3 times daily  -     Spine Referral; Future    Other orders  -     Extra Tube; Future  -     Extra Tube         DISCUSSION  Titrate gabapentin up to 600 mg three times daily to help control pain from sciatica.  Referral to spine clinic given the refractory nature of his more significant chronic symptoms.    Continue current management of diabetes.    Neuralgia seems to be improving likely the increased dose of gabapentin will help further reduce pain symptoms in this regard until it does resolve.  Subjective:     HPI:    Ankush Zaidi is a 71 year old male is here today primarily concerned about chronic right-sided sciatica.  He has had this concern ongoing for 10 to 12 years and gets occasional flareups but has had a persistent flareup since October 2021.  He has normally relied on chiropractic care for alleviating symptoms this has been unsuccessful despite multiple attempts in the past several months.  Reports severe pain especially when sitting on the toilet when he has to lean toward his left.  This can leave him feeling debilitated for hours or even days following.  He states that he did have a fall from the toilet at one point which severely exacerbated his symptoms and he has not had any great resolution since.  He can still walk and  function but has severe pain at times.  Denies any significant neurologic dysfunction such as bowel or bladder incontinence.  Does not have leg weakness or any distinct numbness.    He had shingles diagnosed on the left lower flank in December.  He developed neuralgia was started on gabapentin.  Still has some pain and continues to take gabapentin.    He has type 2 diabetes.  He is on Metformin.  A1c reasonable.  He is on a statin but has not been taking recently because he feels he is taking too many medications he is encouraged to restart.  His blood pressure remains controlled.  He had an eye examination performed this year without any history of retinopathy.  Other than symptoms as previously described denies any neurologic symptoms or pain symptoms in the legs in addition.    ROS:  Complete review of systems is obtained.  Other than the specific considerations noted above complete review of systems is negative.      Objective:   Medications:  Current Outpatient Medications   Medication     aspirin 81 MG EC tablet     gabapentin (NEURONTIN) 300 MG capsule     glipiZIDE (GLUCOTROL XL) 5 MG 24 hr tablet     metFORMIN (GLUCOPHAGE) 500 MG tablet     simvastatin (ZOCOR) 20 MG tablet     No current facility-administered medications for this visit.        Allergies:     Allergies   Allergen Reactions     Voltaren [Diclofenac] Unknown     Blood in urine        Social History     Socioeconomic History     Marital status:      Spouse name: Not on file     Number of children: Not on file     Years of education: Not on file     Highest education level: Not on file   Occupational History     Not on file   Tobacco Use     Smoking status: Former Smoker     Types: Cigarettes     Quit date: 1969     Years since quittin.4     Smokeless tobacco: Never Used   Substance and Sexual Activity     Alcohol use: Yes     Alcohol/week: 3.3 standard drinks     Drug use: No     Sexual activity: Not on file   Other Topics  Concern     Not on file   Social History Narrative     Not on file     Social Determinants of Health     Financial Resource Strain: Not on file   Food Insecurity: Not on file   Transportation Needs: Not on file   Physical Activity: Not on file   Stress: Not on file   Social Connections: Not on file   Intimate Partner Violence: Not on file   Housing Stability: Not on file       No family history on file.     Most Recent Immunizations   Administered Date(s) Administered     COVID-19,PF,Pfizer (12+ Yrs) 11/22/2021     Influenza (High Dose) 3 valent vaccine 11/01/2020     Influenza (IIV3) PF 11/12/2008     Influenza Vaccine, 6+MO IM (QUADRIVALENT W/PRESERVATIVES) 11/04/2019     Influenza, Quad, High Dose, Pf, 65yr+ (Fluzone HD) 01/07/2022     Pneumo Conj 13-V (2010&after) 07/10/2019     Pneumococcal 23 valent 06/16/2010     Td (Adult), Adsorbed 07/30/1999     Tdap (Adacel,Boostrix) 07/10/2019        Wt Readings from Last 3 Encounters:   01/31/22 86.8 kg (191 lb 4.8 oz)   01/07/22 86.9 kg (191 lb 8 oz)   12/10/21 85.9 kg (189 lb 4.8 oz)        BP Readings from Last 6 Encounters:   01/31/22 128/76   01/07/22 112/68   12/10/21 118/70   12/03/21 (!) 149/104   03/08/21 126/76        Hemoglobin A1C   Date Value Ref Range Status   01/31/2022 6.7 (H) 0.0 - 5.6 % Final     Comment:     Normal <5.7%   Prediabetes 5.7-6.4%    Diabetes 6.5% or higher     Note: Adopted from ADA consensus guidelines.   09/09/2021 6.6 (H) 0.0 - 5.6 % Final     Comment:     Normal <5.7%   Prediabetes 5.7-6.4%    Diabetes 6.5% or higher     Note: Adopted from ADA consensus guidelines.   03/08/2021 7.9 (H) <=5.6 % Final      PHYSICAL EXAM:    /76   Pulse 77   Wt 86.8 kg (191 lb 4.8 oz)   SpO2 98%   BMI 28.25 kg/m           General Appearance:    Alert, cooperative, no distress   Eyes:   No scleral icterus or conjunctival irritation       Lungs:     Clear to auscultation bilaterally, respirations unlabored, no wheezes or crackles   Heart:     Regular rate and rhythm,  No murmur   back:    Reports increased pain when bending sideways toward the left away from the affected side on the right.  Causes some increased symptoms into the right leg in a sciatic nerve pattern.     Extremities:  No edema, no joint swelling or redness, no evidence of any injuries   Skin:  No concerning skin findings, no suspicious moles, no rashes   Neurologic:  On gross examination there is no motor or sensory deficit.  Patient walks with a normal gait

## 2022-02-07 ENCOUNTER — OFFICE VISIT (OUTPATIENT)
Dept: PHYSICAL MEDICINE AND REHAB | Facility: CLINIC | Age: 72
End: 2022-02-07
Attending: FAMILY MEDICINE
Payer: COMMERCIAL

## 2022-02-07 ENCOUNTER — TRANSFERRED RECORDS (OUTPATIENT)
Dept: HEALTH INFORMATION MANAGEMENT | Facility: CLINIC | Age: 72
End: 2022-02-07

## 2022-02-07 VITALS
WEIGHT: 192.6 LBS | DIASTOLIC BLOOD PRESSURE: 75 MMHG | HEIGHT: 69 IN | BODY MASS INDEX: 28.53 KG/M2 | SYSTOLIC BLOOD PRESSURE: 123 MMHG | TEMPERATURE: 98.2 F | HEART RATE: 88 BPM

## 2022-02-07 DIAGNOSIS — G89.29 CHRONIC RIGHT-SIDED LOW BACK PAIN WITH RIGHT-SIDED SCIATICA: ICD-10-CM

## 2022-02-07 DIAGNOSIS — M54.41 CHRONIC RIGHT-SIDED LOW BACK PAIN WITH RIGHT-SIDED SCIATICA: ICD-10-CM

## 2022-02-07 PROCEDURE — 99204 OFFICE O/P NEW MOD 45 MIN: CPT | Performed by: PHYSICIAN ASSISTANT

## 2022-02-07 ASSESSMENT — PAIN SCALES - GENERAL: PAINLEVEL: MILD PAIN (2)

## 2022-02-07 ASSESSMENT — MIFFLIN-ST. JEOR: SCORE: 1619.01

## 2022-02-07 NOTE — PATIENT INSTRUCTIONS
An order for physicaltherapy has been provided today.  Someone will call you to schedule physical therapy or you can call [phone] to schedule physical therapy.  It will be very important for you to do your physical therapy exercises on aregular basis to decrease your pain and prevent future flares of pain.       I ordered a seated, open MRI of your lumbar spine at [facility] radiology.  They will call you for an appointment or you can call them at [phone].    My nurse will call you with the MRI results and treatment recommendations/follow up plan.

## 2022-02-07 NOTE — PROGRESS NOTES
ASSESSMENT: Ankush Zaidi is a 71 year old male with past medical history significant for sleep apnea, GERD, type 2 diabetes mellitus, hyperlipidemia who presents today for new patient evaluation of chronic intermittent right low back pain with radiation into the right lower extremity with associated numbness and tingling.  My review of an x-ray lumbar spine shows multilevel disc degeneration as well as advanced L5-S1 facet arthropathy.  I would like to evaluate for possible right L5 or S1 nerve root impingement based on the distribution of his pain.  His x-ray does show significant osteophyte formation throughout the lumbar spine.  Question if he may have DISH.  He is neurologically intact.    PLAN:  A shared decision making model was used.  The patient's values and choices were respected.  The following represents what was discussed and decided upon by the physician assistant and the patient.      1.  DIAGNOSTIC TESTS: I reviewed the x-ray lumbar spine.  I ordered an MRI lumbar spine for further evaluation.  Pain has been refractory to conservative treatment including chiropractic treatment x5-6 and treatment with a sports medicine doctor x5 or 6.  Patient needs an open, upright MRI due to severe claustrophobia so this will be done at [facility].    2.  PHYSICAL THERAPY:  Discussed the importance of core strengthening, ROM, stretching exercises with the patient and how each of these entities is important in decreasing pain.  Explained to the patient that the purpose of physical therapy is to teach the patient a home exercise program.  These exercises need to be performed every day in order to decrease pain and prevent future occurrences of pain.  Entered a referral to physical therapy.  I would like him to do the medx program.    3.  MEDICATIONS: No changes are made to the patient's medications.  -Patient takes gabapentin 600 mg in the morning, 300 mg in the afternoon, and 600 mg at bedtime.   He is titrating his dose up to 60 mg 3 times daily.  This is actually prescribed for post herpetic neuralgia but could be helping with lumbar radicular pain as well.  -I did offer to provide a prescription for tizanidine but patient declined.  He feels like he takes too many medications already.  -Aleve was not helpful.  -Topical medications have not been helpful.    4.  INTERVENTIONS: No interventions were ordered today.  Patient may benefit for interventional pain management depending on the results of the MRI, if he fails to improve with conservative treatment.    5.  PATIENT EDUCATION: Patient is in agreement the above plan.  All questions were answered.  -Pain is triggered with twisting to clean himself after having a bowel movement.  I did suggest that the patient look into getting a reaching assistance device so that he does not have to twist as far.  He will consider this.    6.  FOLLOW-UP:   A nurse will call the patient with the results of his MRI lumbar spine.  At that time I may recommend that the patient trial physical therapy and follow-up with me in 4 weeks versus return to the clinic to review the results.  If he has questions or concerns in the meantime, he should not hesitate to call.      SUBJECTIVE:  Ankush Zaidi  Is a 71 year old male who presents today in consultation at request of Dr. Curry for new patient evaluation of low back pain with radiation into the right lower extremity.  Patient reports that he has had episodic right low back pain for many years.  He typically would get about 1 flare per year which was resolved with only 1 treatment of chiropractic treatment.  He states that in October 2021 his pain flared up after twisting to clean himself after having a bowel movement.  This flare was more severe than his typical flare and began to radiate down the right leg which was new and different.  He tried 5-6 treatments of chiropractic treatment but it was not helpful.  He then saw a  sports medicine doctor and had 5 or 6 treatments with him including massage and some sort of cold or heat therapy.  He had some improvement but unfortunately developed shingles involving the left trunk.  He had to stop his treatments with a sports medicine doctor.  On Christmas he had an even more severe flare of pain after twisting to clean himself after having a bowel movement.  The pain was so severe that it dropped him to the ground.  He had to crawl into the bedroom.  He had severe pain and was unable to get up off the floor for about 10 minutes.  After the shingles cleared up he went back for 1 more treatment with the sports medicine doctor but due to his ongoing symptoms the sports medicine doctor recommended he get some imaging studies done.  He saw his primary care provider who obtained an x-ray and referred him to our clinic for further evaluation and treatment.      Patient complains of right-sided low back pain.  Every time he twists to clean himself after having a bowel movement he gets a flare of the pain that radiates into the right buttock and on the right posterior lateral thigh to the knee.  He does not think it ever extends distal to the knee.  He states that the flares resolve within 1 to 2 minutes of standing and then he does fairly well up until his next bowel movement.  He also experiences some increased pain with bending and tying his shoes.  He has numbness and tingling in the same distribution as his pain.  He denies weakness.  Denies loss of bowel or bladder control.  Denies any recent fevers, chills, sweats.    Patient has not had formal physical therapy for this, although the sports medicine doctor did teach him some core stabilization exercises.  He admits he has not been doing those since he got shingles in December.  As mentioned above, he tried chiropractic treatment for 5 or 6 sessions last fall.  He has never had any spine surgeries or spine injections.  Patient is currently taking  gabapentin for postherpetic neuralgia.  He is taking 600 mg in the morning, 300 mg in the afternoon, and 600 mg at bedtime and he is titrating his dose up to 600 mg 3 times daily.  He tried Aleve and it was not helpful.  He has tried topicals including icy hot and Biofreeze and they are not helpful.    Current Outpatient Medications   Medication     aspirin 81 MG EC tablet     gabapentin (NEURONTIN) 300 MG capsule     glipiZIDE (GLUCOTROL XL) 5 MG 24 hr tablet     metFORMIN (GLUCOPHAGE) 500 MG tablet     simvastatin (ZOCOR) 20 MG tablet         Allergies   Allergen Reactions     Voltaren [Diclofenac] Unknown     Blood in urine           Patient Active Problem List   Diagnosis     Stem cell donor     Diabetes mellitus, type 2 (H)     Acid reflux     Hyperlipidemia     Plantar fasciitis     Sleep apnea       Surgical history: None    Family history: None    Social history: Patient is .  He is retired.  He works at a [employer].  He drinks 1 bottle of wine per week.  Denies tobacco use.  Denies illicit drug use.      ROS: Positive for weight loss, diarrhea, constipation, sciatica.  Specifically negative for bowel/bladder dysfunction, fevers,chills, appetite changes, unexplained weight loss.   Otherwise 13 systems reviewed are negative.  Please see the patient's intake questionnaire from today for details.      OBJECTIVE:  PHYSICAL EXAMINATION:    CONSTITUTIONAL:  Vital signs as above.  No acute distress.  The patient is well nourished and well groomed.  PSYCHIATRIC:  The patient is awake, alert, oriented to person, place, time and answering questions appropriately with clear speech.    HEENT: Normocephalic, atraumatic.  Sclera clear.  Neck is supple.  SKIN:  Skin over the face, bilateral lower extremities, and posterior torso is clean, dry, intact without rashes.    GAIT:  Gait is non-antalgic.  The patient is able to heel and toe walk without significant difficulty.    STANDING EXAMINATION: Tender to  palpation right lower lumbar paraspinous muscles.  Lumbar flexion and extension moderately restricted (baseline per patient).  MUSCLE STRENGTH:  The patient has 5/5 strength for the bilateral hip flexors, knee flexors/extensors, ankle dorsiflexors/plantar flexors, great toe extensors, ankle evertors/invertors.  NEUROLOGICAL: 2+ patellar, and achilles reflexes bilaterally.  Negative Babinski's bilaterally.  No ankle clonus bilaterally. Sensation to light touch is intact in the bilateral L4, L5, and S1 dermatomes.  VASCULAR:  2/4 dorsalis pedis pulses bilaterally.  Bilateral lower extremities are warm.  There is no pitting edema of the bilateral lower extremities.  ABDOMINAL:  Soft, non-distended, non-tender throughout all quadrants.  No pulsatile mass palpated in the left lower quadrant.  LYMPH NODES:  No palpable or tender inguinal lymph nodes.  MUSCULOSKELETAL: Straight leg raise is negative bilaterally.    RESULTS: I reviewed the x-ray lumbar spine from [facility] dated January 31, 2022.  This shows transitional S1 vertebral body.  There is severe disc degeneration L1-L2, L4-L5, L5-S1.  There is also advanced L5-S1 facet arthropathy.

## 2022-02-07 NOTE — LETTER
2/7/2022         RE: Ankush Zaidi  [address]  [address] [facility] [facility] [address]        Dear Colleague,    Thank you for referring your patient, Ankush Zaidi, to the [facility] [place]. Please see a copy of my visit note below.    ASSESSMENT: Ankush Zaidi is a 71 year old male with past medical history significant for sleep apnea, GERD, type 2 diabetes mellitus, hyperlipidemia who presents today for new patient evaluation of chronic intermittent right low back pain with radiation into the right lower extremity with associated numbness and tingling.  My review of an x-ray lumbar spine shows multilevel disc degeneration as well as advanced L5-S1 facet arthropathy.  I would like to evaluate for possible right L5 or S1 nerve root impingement based on the distribution of his pain.  His x-ray does show significant osteophyte formation throughout the lumbar spine.  Question if he may have DISH.  He is neurologically intact.    PLAN:  A shared decision making model was used.  The patient's values and choices were respected.  The following represents what was discussed and decided upon by the physician assistant and the patient.      1.  DIAGNOSTIC TESTS: I reviewed the x-ray lumbar spine.  I ordered an MRI lumbar spine for further evaluation.  Pain has been refractory to conservative treatment including chiropractic treatment x5-6 and treatment with a sports medicine doctor x5 or 6.  Patient needs an open, upright MRI due to severe claustrophobia so this will be done at [facility].    2.  PHYSICAL THERAPY:  Discussed the importance of core strengthening, ROM, stretching exercises with the patient and how each of these entities is important in decreasing pain.  Explained to the patient that the purpose of physical therapy is to teach the patient a home exercise program.  These exercises need to be performed every day in order to decrease pain and prevent future occurrences  of pain.  Entered a referral to physical therapy.  I would like him to do the medx program.    3.  MEDICATIONS: No changes are made to the patient's medications.  -Patient takes gabapentin 600 mg in the morning, 300 mg in the afternoon, and 600 mg at bedtime.  He is titrating his dose up to 60 mg 3 times daily.  This is actually prescribed for post herpetic neuralgia but could be helping with lumbar radicular pain as well.  -I did offer to provide a prescription for tizanidine but patient declined.  He feels like he takes too many medications already.  -Aleve was not helpful.  -Topical medications have not been helpful.    4.  INTERVENTIONS: No interventions were ordered today.  Patient may benefit for interventional pain management depending on the results of the MRI, if he fails to improve with conservative treatment.    5.  PATIENT EDUCATION: Patient is in agreement the above plan.  All questions were answered.  -Pain is triggered with twisting to clean himself after having a bowel movement.  I did suggest that the patient look into getting a reaching assistance device so that he does not have to twist as far.  He will consider this.    6.  FOLLOW-UP:   A nurse will call the patient with the results of his MRI lumbar spine.  At that time I may recommend that the patient trial physical therapy and follow-up with me in 4 weeks versus return to the clinic to review the results.  If he has questions or concerns in the meantime, he should not hesitate to call.      SUBJECTIVE:  Ankush Zaidi  Is a 71 year old male who presents today in consultation at request of Dr. Curry for new patient evaluation of low back pain with radiation into the right lower extremity.  Patient reports that he has had episodic right low back pain for many years.  He typically would get about 1 flare per year which was resolved with only 1 treatment of chiropractic treatment.  He states that in October 2021 his pain flared up after twisting  to clean himself after having a bowel movement.  This flare was more severe than his typical flare and began to radiate down the right leg which was new and different.  He tried 5-6 treatments of chiropractic treatment but it was not helpful.  He then saw a sports medicine doctor and had 5 or 6 treatments with him including massage and some sort of cold or heat therapy.  He had some improvement but unfortunately developed shingles involving the left trunk.  He had to stop his treatments with a sports medicine doctor.  On Christmas he had an even more severe flare of pain after twisting to clean himself after having a bowel movement.  The pain was so severe that it dropped him to the ground.  He had to crawl into the bedroom.  He had severe pain and was unable to get up off the floor for about 10 minutes.  After the shingles cleared up he went back for 1 more treatment with the sports medicine doctor but due to his ongoing symptoms the sports medicine doctor recommended he get some imaging studies done.  He saw his primary care provider who obtained an x-ray and referred him to our clinic for further evaluation and treatment.      Patient complains of right-sided low back pain.  Every time he twists to clean himself after having a bowel movement he gets a flare of the pain that radiates into the right buttock and on the right posterior lateral thigh to the knee.  He does not think it ever extends distal to the knee.  He states that the flares resolve within 1 to 2 minutes of standing and then he does fairly well up until his next bowel movement.  He also experiences some increased pain with bending and tying his shoes.  He has numbness and tingling in the same distribution as his pain.  He denies weakness.  Denies loss of bowel or bladder control.  Denies any recent fevers, chills, sweats.    Patient has not had formal physical therapy for this, although the sports medicine doctor did teach him some core  stabilization exercises.  He admits he has not been doing those since he got shingles in December.  As mentioned above, he tried chiropractic treatment for 5 or 6 sessions last fall.  He has never had any spine surgeries or spine injections.  Patient is currently taking gabapentin for postherpetic neuralgia.  He is taking 600 mg in the morning, 300 mg in the afternoon, and 600 mg at bedtime and he is titrating his dose up to 600 mg 3 times daily.  He tried Aleve and it was not helpful.  He has tried topicals including icy hot and Biofreeze and they are not helpful.    Current Outpatient Medications   Medication     aspirin 81 MG EC tablet     gabapentin (NEURONTIN) 300 MG capsule     glipiZIDE (GLUCOTROL XL) 5 MG 24 hr tablet     metFORMIN (GLUCOPHAGE) 500 MG tablet     simvastatin (ZOCOR) 20 MG tablet         Allergies   Allergen Reactions     Voltaren [Diclofenac] Unknown     Blood in urine           Patient Active Problem List   Diagnosis     Stem cell donor     Diabetes mellitus, type 2 (H)     Acid reflux     Hyperlipidemia     Plantar fasciitis     Sleep apnea       Surgical history: None    Family history: None    Social history: Patient is .  He is retired.  He works at a [employer].  He drinks 1 bottle of wine per week.  Denies tobacco use.  Denies illicit drug use.      ROS: Positive for weight loss, diarrhea, constipation, sciatica.  Specifically negative for bowel/bladder dysfunction, fevers,chills, appetite changes, unexplained weight loss.   Otherwise 13 systems reviewed are negative.  Please see the patient's intake questionnaire from today for details.      OBJECTIVE:  PHYSICAL EXAMINATION:    CONSTITUTIONAL:  Vital signs as above.  No acute distress.  The patient is well nourished and well groomed.  PSYCHIATRIC:  The patient is awake, alert, oriented to person, place, time and answering questions appropriately with clear speech.    HEENT: Normocephalic, atraumatic.  Sclera clear.  Neck  is supple.  SKIN:  Skin over the face, bilateral lower extremities, and posterior torso is clean, dry, intact without rashes.    GAIT:  Gait is non-antalgic.  The patient is able to heel and toe walk without significant difficulty.    STANDING EXAMINATION: Tender to palpation right lower lumbar paraspinous muscles.  Lumbar flexion and extension moderately restricted (baseline per patient).  MUSCLE STRENGTH:  The patient has 5/5 strength for the bilateral hip flexors, knee flexors/extensors, ankle dorsiflexors/plantar flexors, great toe extensors, ankle evertors/invertors.  NEUROLOGICAL: 2+ patellar, and achilles reflexes bilaterally.  Negative Babinski's bilaterally.  No ankle clonus bilaterally. Sensation to light touch is intact in the bilateral L4, L5, and S1 dermatomes.  VASCULAR:  2/4 dorsalis pedis pulses bilaterally.  Bilateral lower extremities are warm.  There is no pitting edema of the bilateral lower extremities.  ABDOMINAL:  Soft, non-distended, non-tender throughout all quadrants.  No pulsatile mass palpated in the left lower quadrant.  LYMPH NODES:  No palpable or tender inguinal lymph nodes.  MUSCULOSKELETAL: Straight leg raise is negative bilaterally.    RESULTS: I reviewed the x-ray lumbar spine from [facility] dated January 31, 2022.  This shows transitional S1 vertebral body.  There is severe disc degeneration L1-L2, L4-L5, L5-S1.  There is also advanced L5-S1 facet arthropathy.        Again, thank you for allowing me to participate in the care of your patient.        Sincerely,        Jeny Jones PA-C

## 2022-02-09 ENCOUNTER — TELEPHONE (OUTPATIENT)
Dept: PHYSICAL MEDICINE AND REHAB | Facility: CLINIC | Age: 72
End: 2022-02-09
Payer: COMMERCIAL

## 2022-02-09 NOTE — PROGRESS NOTES
Assessment:   Ankush Zaidi is a 71 year old y.o. male with past medical history significant for sleep apnea, GERD, type 2 diabetes mellitus, hyperlipidemia  who presents today for follow-up regarding  chronic intermittent right low back pain with radiation into the right lower extremity with associated numbness and tingling.  My review of an MRI lumbar spine shows moderate disc degeneration and moderate facet arthropathy causing moderate bilateral foraminal stenosis.  There is trace degenerative spondylolisthesis at this level.  Patient is neurologically intact.       Plan:     A shared decision making plan was used.  The patient's values and choices were respected.  The following represents what was discussed and decided upon by the physician assistant and the patient.      1.  DIAGNOSTIC TESTS: I reviewed the MRI lumbar spine.   -I ordered flexion-extension lumbar spine x-rays to make sure there is not any instability at the L4-5 level.    2.  PHYSICAL THERAPY: Patient has been referred to physical therapy.  I would like him to do the medics program.  He was given the phone number to schedule.    3.  MEDICATIONS:  No changes are made to the patient's medications.  -Patient takes gabapentin 600 mg 3 times daily.  This is actually prescribed for post herpetic neuralgia but could be helping with lumbar radicular pain as well.  -I did offer to provide a prescription for tizanidine but patient declined.  He feels like he takes too many medications already.  -Aleve was not helpful.  -Topical medications have not been helpful.    4.  INTERVENTIONS: No interventions were ordered today.  Patient is going to trial physical therapy first.  -If he fails to improve, we could try a right L4-5 transforaminal epidural steroid injection.    5.  PATIENT EDUCATION: Patient is in agreement the above plan.  All questions were answered.    6.  FOLLOW-UP: Patient will follow up with me in 6 weeks to monitor progress with physical  therapy.  If he has questions or concerns in the meantime, he should not hesitate to call.    Subjective:     Ankush Zaidi is a 71 year old male who presents today for follow-up regarding chronic intermittent right low back pain with radiation into the right lower extremity with associated numbness and tingling.  I saw the patient in consultation February 7, 2022.  At that time I ordered an MRI lumbar spine.  He returns today to review the results.  He denies any change in his pain since he was last seen.    Patient complains of right-sided low back pain.  Almost every time he twists to clean himself after having a bowel movement he gets a flare of pain that radiates into the right buttock and down the right posterior lateral thigh to the knee.  Flares resolve within 1 to 2 minutes of standing and then he does fairly well up until his next bowel movement.  He also has some increased pain with bending and tying his shoes.  He rates his pain today as a 1 out of 10.  At its worst it is an 8 or 10.  At its best it is a 1 out of 10.    Patient has not had any formal physical therapy for this, although a sports medicine doctor did teach him some core stabilization exercises.  He has tried 5 or 6 sessions of chiropractic treatment.  He is currently taking gabapentin for postherpetic neuralgia.  He takes 600 mg 3 times daily.    Review of Systems:  Positive for numbness/tingling, weakness.  Negative for loss of bowel/bladder control, inability to urinate, headache, pain much worse at night, trip/stumble/falls, difficulty swallowing, difficulty with hand skills, fevers, unintentional weight loss.     Objective:   CONSTITUTIONAL:  Vital signs as above.  No acute distress.  The patient is well nourished and well groomed.    PSYCHIATRIC:  The patient is awake, alert, oriented to person, place and time.  The patient is answering questions appropriately with clear speech.  Normal affect.  HEENT: Normocephalic, atraumatic.   Sclera clear.    SKIN:  Skin over the face, posterior torso, bilateral upper and lower extremities is clean, dry, intact without rashes.  VASCULAR: No significant lower extremity edema.  MUSCULOSKELETAL:  Gait is non-antalgic.  The patient is able to heel and toe walk without any difficulty.  Mild tenderness over the right lower lumbar paraspinal muscles.      The patient has 5/5 strength for the bilateral hip flexors, knee flexors/extensors, ankle dorsiflexors/plantar flexors.  NEUROLOGICAL: 2+ patellar reflexes which are symmetric bilaterally.   Sensation to light touch is intact in the bilateral L4, L5, and S1 dermatomes.       RESULTS: I reviewed the MRI lumbar spine from [facility] dated February 7, 2022.  At L4-5 there is moderate disc degeneration and moderate bilateral facet degeneration.  There is trace degenerative spondylolisthesis.  There is moderate bilateral foraminal stenosis at this level.  At L3-4 there is moderate disc degeneration and minimal facet degeneration with mild right and mild to moderate left foraminal stenosis.  Please see report for further details.

## 2022-02-09 NOTE — TELEPHONE ENCOUNTER
Patient returned call. Results given and explained. Explained the recommended MedX PT and then follow up with [facility]. Also offered an appointment with [facility] to see mages and talk about treatment options. He wanted to come in. Transferred to scheduling to make appointment.

## 2022-02-09 NOTE — TELEPHONE ENCOUNTER
Call placed to patient with provider's results and recommendations.  Left message to return call.

## 2022-02-10 ENCOUNTER — OFFICE VISIT (OUTPATIENT)
Dept: PHYSICAL MEDICINE AND REHAB | Facility: CLINIC | Age: 72
End: 2022-02-10
Attending: PHYSICIAN ASSISTANT
Payer: COMMERCIAL

## 2022-02-10 VITALS
TEMPERATURE: 98.4 F | WEIGHT: 192 LBS | BODY MASS INDEX: 28.44 KG/M2 | HEIGHT: 69 IN | SYSTOLIC BLOOD PRESSURE: 141 MMHG | DIASTOLIC BLOOD PRESSURE: 82 MMHG | HEART RATE: 74 BPM

## 2022-02-10 DIAGNOSIS — M54.16 LUMBAR RADICULAR PAIN: Primary | ICD-10-CM

## 2022-02-10 DIAGNOSIS — M47.816 LUMBAR FACET ARTHROPATHY: ICD-10-CM

## 2022-02-10 DIAGNOSIS — M48.061 LUMBAR FORAMINAL STENOSIS: ICD-10-CM

## 2022-02-10 PROCEDURE — 99214 OFFICE O/P EST MOD 30 MIN: CPT | Performed by: PHYSICIAN ASSISTANT

## 2022-02-10 ASSESSMENT — PAIN SCALES - GENERAL: PAINLEVEL: NO PAIN (1)

## 2022-02-10 ASSESSMENT — MIFFLIN-ST. JEOR: SCORE: 1616.29

## 2022-02-10 NOTE — PATIENT INSTRUCTIONS
An order for physicaltherapy has been provided today.  Someone will call you to schedule physical therapy or you can call [phone] to schedule physical therapy.  It will be very important for you to do your physical therapy exercises on aregular basis to decrease your pain and prevent future flares of pain.     You have a moderate degree of wear and tear to the cartilage disc at L4-5 and a moderate degree of arthritis in the joints at L4-5.  The combination of the disc degeneration and to the arthritis in the joint is causing a moderate degree of narrowing around the nerves as they exit out of the spine which I believe is the source of the pain.  We will have you start with a strengthening physical therapy program to build up the muscles that support that area of your spine.  If that does not help, we could try a steroid injection (cortisone shot).

## 2022-03-01 ENCOUNTER — HOSPITAL ENCOUNTER (OUTPATIENT)
Dept: PHYSICAL THERAPY | Facility: CLINIC | Age: 72
End: 2022-03-01
Payer: COMMERCIAL

## 2022-03-01 DIAGNOSIS — M54.16 LUMBAR RADICULAR PAIN: ICD-10-CM

## 2022-03-01 DIAGNOSIS — M47.816 LUMBAR FACET ARTHROPATHY: ICD-10-CM

## 2022-03-01 PROCEDURE — 97110 THERAPEUTIC EXERCISES: CPT | Mod: GP

## 2022-03-01 PROCEDURE — 97161 PT EVAL LOW COMPLEX 20 MIN: CPT | Mod: GP

## 2022-03-01 NOTE — PROGRESS NOTES
[facility]    OUTPATIENT PHYSICAL THERAPY ORTHOPEDIC EVALUATION  PLAN OF TREATMENT FOR OUTPATIENT REHABILITATION  (COMPLETE FOR INITIAL CLAIMS ONLY)  Patient's Last Name, First Name, M.I.  YOB: 1950  Ankush Zaidi    Provider s Name:  [facility]   Medical Record No.  [id number]   Start of Care Date:  03/01/22   Onset Date:      Type:     _X__PT   ___OT   ___SLP Medical Diagnosis:  (P) M54.16 (ICD-10-CM) - Lumbar radicular pain M47.816 (ICD-10-CM) - Lumbar facet arthropathy     PT Diagnosis:  (P) R sided low back pain; core and hip weakness; decreased trunk ROM   Visits from SOC:  1      _________________________________________________________________________________  Plan of Treatment/Functional Goals:  (P) ADL retraining, balance training, gait training, joint mobilization, manual therapy, neuromuscular re-education, ROM, strengthening, stretching     (P) Cryotherapy, Electrical stimulation, TENS, Traction, Ultrasound, Hot packs     Goals  Goal Identifier: (P) Pain  Goal Description: (P) Patient will report no greater than 2/10 at worst to improve QoL.   Target Date: (P) 04/26/22    Goal Identifier: (P) Toileting  Goal Description: (P) Patient will report no discomfort when toileting to return to PLOF and improve QoL.   Target Date: (P) 04/26/22    Goal Identifier: (P) Lifting/Carrying  Goal Description: (P) Patient will demonstrate ability to lift 20# with good mechanics and no pain to return to PLOF.   Target Date: (P) 04/26/22    Goal Identifier: (P) Hip Strength  Goal Description: (P) Patient will improve B hip strength to at least 4+/5 to improve functional strength and offload low back.   Target Date: (P) 04/26/22              Therapy Frequency:  (P) 2 times/Week  Predicted Duration of Therapy Intervention:  (P) 8 weeks    Scott Parmer, PT                  I CERTIFY THE NEED FOR THESE SERVICES FURNISHED UNDER        THIS PLAN OF TREATMENT AND WHILE UNDER MY CARE     (Physician co-signature of this document indicates review and certification of the therapy plan).                       Certification Date From:  (P) 03/01/22   Certification Date To:  (P) 05/29/22    Referring Provider:  Jeny Jones PA-C    Initial Assessment        See Epic Evaluation Start of Care Date: 03/01/22 03/01/22 0900   General Information   Type of Visit Initial OP Ortho PT Evaluation   Start of Care Date 03/01/22   Referring Physician Jeny Jones PA-C   Orders Evaluate and Treat   Date of Order 02/10/22   Certification Required? Yes   Medical Diagnosis M54.16 (ICD-10-CM) - Lumbar radicular pain M47.816 (ICD-10-CM) - Lumbar facet arthropathy   Surgical/Medical history reviewed Yes   Precautions/Limitations no known precautions/limitations   Body Part(s)   Body Part(s) Hip;Lumbar Spine/SI   Presentation and Etiology   Pertinent history of current problem (include personal factors and/or comorbidities that impact the POC) Patient reports chronic low back issues, this has typically been resolved by a trip to the chiropractor typically. This has not been the case with this episode of back pain. Patient reports most difficulty having a bowel movement when leaning to the side and twisting when finishing and cleaning. He does not notice the pain at any other point typically. He currently reports pain at a 2/10. Functionally, he has the most difficulty with toileting, prolonged standing, walking, bending, twisting, and lifting.    Impairments D. Decreased ROM;A. Pain;E. Decreased flexibility;F. Decreased strength and endurance;G. Impaired balance;H. Impaired gait;K. Numbness;L. Tingling;J. Burning   Functional Limitations perform activities of daily living   Symptom Location Low back, R LE   How/Where did it occur From  insidious onset   Chronicity Chronic   Pain rating (0-10 point scale) Best (/10);Worst (/10)   Best (/10) 2   Worst (/10) 7   Pain quality B. Dull;C. Aching;D. Burning   Frequency of pain/symptoms B. Intermittent   Pain/symptoms are:   (with certain activities )   Current / Previous Interventions   Diagnostic Tests: MRI   MRI Results Results   MRI results L1-L2 disc bulge with foraminal stenosis; L4-L5 DDD with foraminal stenosis    Fall Risk Screen   Fall screen completed by PT   Have you fallen 2 or more times in the past year? No   Have you fallen and had an injury in the past year? No   Is patient a fall risk? No   Abuse Screen (yes response referral indicated)   Feels Unsafe at Home or Work/School no   Feels Threatened by Someone no   Does Anyone Try to Keep You From Having Contact with Others or Doing Things Outside Your Home? no   Physical Signs of Abuse Present no   System Outcome Measures   Outcome Measures Low Back Pain (see Oswestry and Jeanne)   Lumbar Spine/SI Objective Findings   Observation Overall forward posture   Integumentary No deficits noted   Posture Left lean in standing, anterior pelvic tilt    Gait/Locomotion Slight left lean throughout cycle   Balance/Proprioception (Single Leg Stance) SLS R: 8 sec with hip drop and more difficulty; SLS L: 30 sec no pain   Flexion ROM Fingertips to toes: 18 cm with stiffness   Extension ROM severe loss with stiffness   Right Side Bending ROM fingertips to mid thigh with R sided low back pinching   Left Side Bending ROM Fingertips to knee joint no pain   Pelvic Screen Anteriorly rotated R innominate    Hip Screen Full hip PROM B, slight pain at end ranges on R    Hip Flexion (L2) Strength 5/5 B   Hip Abduction Strength 4-/5 R, 4/5 L   Hip Extension Strength 4/5 B   Knee Flexion Strength 5/5 B   Knee Extension (L3) Strength 5/5 B   Ankle Dorsiflexion (L4) Strength 5/5 B   Great Toe Extension (L5) Strength 5/5 B   Ankle Plantar Flexion (S1) Strength 5/5 B    Hamstring Flexibility 90/90: -15 deg B    Obers Flexibility (-) B   Piriformis Flexibility Increased tightness R    SLR 40 deg L; 45 deg R    Crossover SLR (-)    Slump Test (-)    Spring Test Limited spring through lumbar spine with slight tenderness   Segmental Mobility Hypomobile lumbar spine with PAIVMs   Sensation Testing Light touch intact through dermatomal pattern B   Palpation Tender to the R lumbar paraspinals, gluteals, QL    Planned Therapy Interventions   Planned Therapy Interventions ADL retraining;balance training;gait training;joint mobilization;manual therapy;neuromuscular re-education;ROM;strengthening;stretching   Planned Modality Interventions   Planned Modality Interventions Cryotherapy;Electrical stimulation;TENS;Traction;Ultrasound;Hot packs   Clinical Impression   Criteria for Skilled Therapeutic Interventions Met yes, treatment indicated   PT Diagnosis R sided low back pain; core and hip weakness; decreased trunk ROM   Functional limitations due to impairments Bending, twisting, lifting, sitting, walking   Clinical Presentation Stable/Uncomplicated   Clinical Decision Making (Complexity) Low complexity   Therapy Frequency 2 times/Week   Predicted Duration of Therapy Intervention (days/wks) 8 weeks   Risk & Benefits of therapy have been explained Yes   Patient, Family & other staff in agreement with plan of care Yes   Clinical Impression Comments Patient is a 71 year old male presenting to physical therapy with occasionally radiating R sided chronic low back pain. Functionally, patient has the most trouble with toileting and shifting when in a sitting position. He also gets morning low back stiffness and has functional mobility deficits. Patient's prognosis to improve pain and function is good due to his willingnes to participate in PT and perform HEP. Patient would benefit from skilled PT services to address limitations in function.    ORTHO GOALS   PT Ortho Eval Goals 1;2;3;4   Ortho Goal  1   Goal Identifier Pain   Goal Description Patient will report no greater than 2/10 at worst to improve QoL.    Target Date 04/26/22   Ortho Goal 2   Goal Identifier Toileting   Goal Description Patient will report no discomfort when toileting to return to PLOF and improve QoL.    Target Date 04/26/22   Ortho Goal 3   Goal Identifier Lifting/Carrying   Goal Description Patient will demonstrate ability to lift 20# with good mechanics and no pain to return to PLOF.    Target Date 04/26/22   Ortho Goal 4   Goal Identifier Hip Strength   Goal Description Patient will improve B hip strength to at least 4+/5 to improve functional strength and offload low back.    Target Date 04/26/22   Total Evaluation Time   PT Eval, Low Complexity Minutes (74873) 28   Therapy Certification   Certification date from 03/01/22   Certification date to 05/29/22   Medical Diagnosis M54.16 (ICD-10-CM) - Lumbar radicular pain M47.816 (ICD-10-CM) - Lumbar facet arthropathy     Lumbar MedX Initial testing    AROM (full=  0-72  lumbar) 0-42   Max Extension Torque  277   Flex: ext ratio (ideal 1.4:1) 2.77:1         Date 3/1/22   Lumbar Parameters    Top Dead Center (TDC) 18   Counterbalance (CB) 450   Seat Pad 1   Femur Restraint 5   Week/Visit    Enter Week/Visit # W1V1   Weight (lbs) 277# (Max)    Reps (#) --   Time --   ROM (degrees) 0-42   Pain none   Flex:Ext ratio 2.77:1   Cervical Parameters    Top Dead Center (TDC)    Counterbalance (CB)    Seat Height    Week/Visit    Enter Week/Visit #    Weight (lbs)    Reps (#)    Time    ROM (degrees)    Pain    Flex:Ext ratio      Date 3/1/22   Exercise    Supine PPTs X 10, 5 sec holds   Sidelying clamshells X 10 B, orange band   Supine LTRs X 10 B   Supine SKTC stretch  X 30 sec holds each leg                                      Scott Parmer, PT, DPT

## 2022-03-03 ENCOUNTER — OFFICE VISIT (OUTPATIENT)
Dept: FAMILY MEDICINE | Facility: CLINIC | Age: 72
End: 2022-03-03
Payer: COMMERCIAL

## 2022-03-03 VITALS
OXYGEN SATURATION: 98 % | TEMPERATURE: 98.5 F | HEART RATE: 103 BPM | DIASTOLIC BLOOD PRESSURE: 70 MMHG | SYSTOLIC BLOOD PRESSURE: 118 MMHG

## 2022-03-03 DIAGNOSIS — J02.9 SORE THROAT: Primary | ICD-10-CM

## 2022-03-03 LAB — DEPRECATED S PYO AG THROAT QL EIA: NEGATIVE

## 2022-03-03 PROCEDURE — 99213 OFFICE O/P EST LOW 20 MIN: CPT | Performed by: FAMILY MEDICINE

## 2022-03-03 PROCEDURE — 87651 STREP A DNA AMP PROBE: CPT | Performed by: FAMILY MEDICINE

## 2022-03-03 NOTE — LETTER
March 6, 2022      Ankush Zaidi  [address]  [place] [facility] [address]        Dear ,    We are writing to inform you of your test results.    Test results are negative    Resulted Orders   Streptococcus A Rapid Screen w/Reflex to PCR - Clinic Collect   Result Value Ref Range    Group A Strep antigen Negative Negative   Group A Streptococcus PCR Throat Swab   Result Value Ref Range    Group A strep by PCR Not Detected Not Detected    Narrative    The Xpert Xpress Strep A test, performed on the [employer], is a rapid, qualitative in vitro diagnostic test for the detection of Streptococcus pyogenes (Group A ß-hemolytic Streptococcus, Strep A) in throat swab specimens from patients with signs and symptoms of pharyngitis. The Xpert Xpress Strep A test can be used as an aid in the diagnosis of Group A Streptococcal pharyngitis. The assay is not intended to monitor treatment for Group A Streptococcus infections. The Xpert Xpress Strep A test utilizes an automated real-time polymerase chain reaction (PCR) to detect Streptococcus pyogenes DNA.       If you have any questions or concerns, please call the clinic at the number listed above.       Sincerely,      Jody Rodriguez MD

## 2022-03-03 NOTE — PATIENT INSTRUCTIONS
Patient Education     Self-Care for Sore Throats     Sore throats happen for many reasons, such as colds, allergies, cigarette smoke, air pollution, and infections caused by viruses or bacteria. In any case, your throat becomes red and sore. Your goal for self-care is to reduce your discomfort while giving your throat a chance to heal.  Moisten and soothe your throat  Tips include the following:    Try a sip of water first thing after waking up.    Keep your throat moist by drinking 6 or more glasses of clear liquids every day.    Run a cool-air humidifier in your room overnight.    Stay away from cigarette smoke.     Check the air quality index,if air pollution gives you a sore throat. On high pollution days, try to limit outdoor time.    Suck on throat lozenges, cough drops, hard candy, ice chips, or frozen fruit-juice bars. Use the sugar-free versions if your diet or medical condition requires them.  Gargle to ease irritation  Gargling every hour or 2 can ease irritation. Try gargling with 1 of these solutions:    1/4 teaspoon of salt in 1/2 cup of warm water    An over-the-counter anesthetic gargle  Use medicine for more relief  Over-the-counter medicine can reduce sore throat symptoms. Ask your pharmacist if you have questions about which medicine to use. To prevent possible medicine interactions, let the pharmacist know what medicines you take. To decrease symptoms:    Ease pain with anesthetic sprays. Aspirin or an aspirin substitute also helps. Remember, never give aspirin to anyone 18 or younger. Don't take aspirin if you are already taking blood thinners.     For sore throats caused by allergies, try antihistamines to block the allergic reaction.  Unless a sore throat is caused by a bacterial infection, antibiotics won t help you.  Prevent future sore throats  Prevention tips include:    Stop smoking or reduce contact with secondhand smoke. Smoke irritates the tender throat lining.    Limit contact with  pets and with allergy-causing substances, such as pollen and mold.    Wash your hands often when you re around someone with a sore throat or cold. This will keep viruses or bacteria from spreading.    Limit outdoor time when air pollution is bad.    Don t strain your vocal cords.  When to call your healthcare provider  Contact your healthcare provider if you have:    Fever of 100.4 F (38.0 C) or higher, or as directed by your healthcare provider    White spots on the throat    Great Trouble swallowing    A skin rash    Recent exposure to someone else with strep bacteria    Severe hoarseness and swollen glands in the neck or jaw  Call 911  Call 911 if any of the following occur:    Trouble breathing or catching your breath    Drooling and problems swallowing    Wheezing    Unable to talk    Feeling dizzy or faint    Feeling of doom  Nadia last reviewed this educational content on 9/1/2019 2000-2021 [employer]. All rights reserved. This information is not intended as a substitute for professional medical care. Always follow your healthcare professional's instructions.

## 2022-03-03 NOTE — PROGRESS NOTES
"  Assessment & Plan     Sore throat  Rapid strep is negative.  Discussed with patient that symptoms are most likely due to viral infection.  Recommend symptomatic cares.  Suggested Tylenol or ibuprofen for discomfort.  Can also do salt water gargles.  Encouraged extra rest and fluids.  Follow-up if symptoms worsening, not improving or if new concerns develop.  - Streptococcus A Rapid Screen w/Reflex to PCR - Clinic Collect  - Group A Streptococcus PCR Throat Swab               BMI:   Estimated body mass index is 28.35 kg/m  as calculated from the following:    Height as of 2/10/22: 1.753 m (5' 9\").    Weight as of 2/10/22: 87.1 kg (192 lb).           No follow-ups on file.    Jody Rodriguez MD  [facility] SERGEY Lechuga is a 71 year old who presents for the following health issues     HPI   He comes in today complaining of a sore throat.  Started 2 days ago.  Symptoms progressively worsening.  Has difficulty swallowing due to discomfort.  Voice is hoarse.  No other symptoms.  No headache.  No rhinorrhea or congestion.  No ear pain.  No coughing.  No body aches.  No fevers or chills.  No rash.  Has had no exposure to anyone with strep throat that he is aware of.  He is fully vaccinated for COVID-19.  Medications and allergies are reviewed and updated.  Review of systems is otherwise negative.  No other concerns today        Review of Systems         Objective    /70 (BP Location: Right arm, Cuff Size: Adult Regular)   Pulse 103   Temp 98.5  F (36.9  C) (Temporal)   SpO2 98%   There is no height or weight on file to calculate BMI.  Physical Exam   GENERAL: healthy, alert and no distress  EYES: Eyes grossly normal to inspection, PERRL and conjunctivae and sclerae normal  HENT: normal cephalic/atraumatic, ear canals and TM's normal, nose and mouth without ulcers or lesions, oral mucous membranes moist and mild pharyngeal erythema, no exudate   NECK: bilateral anterior cervical " adenopathy  RESP: lungs clear to auscultation - no rales, rhonchi or wheezes  CV: regular rate and rhythm, normal S1 S2, no S3 or S4, no murmur, click or rub, no peripheral edema and peripheral pulses strong  PSYCH: mentation appears normal, affect normal/bright    Results for orders placed or performed in visit on 03/03/22 (from the past 24 hour(s))   Streptococcus A Rapid Screen w/Reflex to PCR - Clinic Collect    Specimen: Throat; Swab   Result Value Ref Range    Group A Strep antigen Negative Negative

## 2022-03-04 LAB — GROUP A STREP BY PCR: NOT DETECTED

## 2022-03-10 ENCOUNTER — HOSPITAL ENCOUNTER (OUTPATIENT)
Dept: PHYSICAL THERAPY | Facility: CLINIC | Age: 72
Discharge: HOME | End: 2022-03-10
Payer: COMMERCIAL

## 2022-03-10 DIAGNOSIS — M54.16 LUMBAR RADICULAR PAIN: Primary | ICD-10-CM

## 2022-03-10 DIAGNOSIS — M47.816 LUMBAR FACET ARTHROPATHY: ICD-10-CM

## 2022-03-10 PROCEDURE — 97110 THERAPEUTIC EXERCISES: CPT | Mod: GP

## 2022-03-10 NOTE — PROGRESS NOTES
Subjective:   Patient's back has been feeling pretty good overall. Has been working on the exercises. No instances with back cramping in the last week.     Assessment   Ankush presents to PT for his first MedX follow up. Reports compliance with the HEP thus far. Tolerated MedX strengthening well today with no additional pain. Remains appropriate for skilled PT services.     Lumbar MedX Initial testing    AROM (full=  0-72  lumbar) 0-42   Max Extension Torque  277   Flex: ext ratio (ideal 1.4:1) 2.77:1         Date 3/10/22 3/1/22   Lumbar Parameters     Top Dead Center (TDC) 18 18   Counterbalance (CB) 450 450   Seat Pad 1 1   Femur Restraint 5 5   Week/Visit     Enter Week/Visit # W1V2 W1V1   Weight (lbs) 120#  277# (Max)    Reps (#) 30 --   Time  --   ROM (degrees) 0-42 0-42   Pain none None   Flex:Ext ratio  2.77:1   Cervical Parameters     Top Dead Center (TDC)     Counterbalance (CB)     Seat Height     Week/Visit     Enter Week/Visit #     Weight (lbs)     Reps (#)     Time     ROM (degrees)     Pain     Flex:Ext ratio       Date 3/10/22 3/1/22   Exercise     Supine PPTs X 10  X 10, 5 sec holds   Sidelying clamshells Verbally discussed X 10 B, orange band   Supine LTRs X 10 B X 10 B   Supine SKTC stretch   X 30 sec holds each leg    NuStep X 4 min    Rotary Torso 30# to the R    S/l bow and arrow X 10 B                                Scott Parmer, PT, DPT

## 2022-03-14 ENCOUNTER — HOSPITAL ENCOUNTER (OUTPATIENT)
Dept: PHYSICAL THERAPY | Facility: CLINIC | Age: 72
Discharge: HOME | End: 2022-03-14
Attending: PHYSICIAN ASSISTANT
Payer: COMMERCIAL

## 2022-03-14 DIAGNOSIS — M47.816 LUMBAR FACET ARTHROPATHY: ICD-10-CM

## 2022-03-14 DIAGNOSIS — M54.16 LUMBAR RADICULAR PAIN: Primary | ICD-10-CM

## 2022-03-14 PROCEDURE — 97110 THERAPEUTIC EXERCISES: CPT | Mod: GP

## 2022-03-14 NOTE — PROGRESS NOTES
Subjective:   Patient's back has been feeling pretty good overall. Has been working on the exercises. No issues over the weekend. Felt good after last session.     Assessment   Ankush presents to PT for his second MedX follow up. Reports compliance with the HEP thus far. Tolerated MedX strengthening well today with no additional pain. Progressed HEP with additional stretching and core strength today.  Remains appropriate for skilled PT services.     Lumbar MedX Initial testing    AROM (full=  0-72  lumbar) 0-42   Max Extension Torque  277   Flex: ext ratio (ideal 1.4:1) 2.77:1         Date 3/14/22 3/10/22 3/1/22   Lumbar Parameters      Top Dead Center (TDC) 18 18 18   Counterbalance (CB) 450 450 450   Seat Pad 1 1 1   Femur Restraint 5 5 5   Week/Visit      Enter Week/Visit # W2V1 W1V2 W1V1   Weight (lbs) 130# 120#  277# (Max)    Reps (#) 30 30 --   Time   --   ROM (degrees) 0-42 0-42 0-42   Pain None none None   Flex:Ext ratio   2.77:1   Cervical Parameters      Top Dead Center (TDC)      Counterbalance (CB)      Seat Height      Week/Visit      Enter Week/Visit #      Weight (lbs)      Reps (#)      Time      ROM (degrees)      Pain      Flex:Ext ratio        Date 3/14/22 3/10/22 3/1/22   Exercise      Supine PPTs  X 10  X 10, 5 sec holds   Sidelying clamshells  Verbally discussed X 10 B, orange band   Supine LTRs  X 10 B X 10 B   Supine SKTC stretch    X 30 sec holds each leg    NuStep  X 4 min    Rotary Torso 32# to the R  30# to the R    S/l bow and arrow  X 10 B     Supine Marching  X 10 B     Supine hamstring stretch X 30 sec holds B      Supine pretzel  X 30 sec holds                   Scott Parmer, PT, DPT

## 2022-03-17 ENCOUNTER — HOSPITAL ENCOUNTER (OUTPATIENT)
Dept: PHYSICAL THERAPY | Facility: CLINIC | Age: 72
Discharge: HOME | End: 2022-03-17
Payer: COMMERCIAL

## 2022-03-17 DIAGNOSIS — M54.41 CHRONIC RIGHT-SIDED LOW BACK PAIN WITH RIGHT-SIDED SCIATICA: ICD-10-CM

## 2022-03-17 DIAGNOSIS — G89.29 CHRONIC RIGHT-SIDED LOW BACK PAIN WITH RIGHT-SIDED SCIATICA: ICD-10-CM

## 2022-03-17 PROCEDURE — 97110 THERAPEUTIC EXERCISES: CPT | Mod: GP

## 2022-03-17 PROCEDURE — 97140 MANUAL THERAPY 1/> REGIONS: CPT | Mod: GP

## 2022-03-17 NOTE — PROGRESS NOTES
Subjective:   Patient is very sore today through his hamstrings and hips. Installed a toilet yesterday and thinks it is from that. No issues with the back this week. Stretches and exercises have been going well.     Assessment   Ankush presents to PT for his third MedX follow up. Reports compliance with the HEP thus far. Tolerated MedX strengthening well today with no additional pain. Addressed soreness with manual therapy today. Remains appropriate for skilled PT services.     Lumbar MedX Initial testing    AROM (full=  0-72  lumbar) 0-42   Max Extension Torque  277   Flex: ext ratio (ideal 1.4:1) 2.77:1         Date 3/17/22 3/14/22 3/10/22 3/1/22   Lumbar Parameters       Top Dead Center (TDC) 18 18 18 18   Counterbalance (CB) 450 450 450 450   Seat Pad 1 1 1 1   Femur Restraint 5 5 5 5   Week/Visit       Enter Week/Visit # W2V2 W2V1 W1V2 W1V1   Weight (lbs) 136# 130# 120#  277# (Max)    Reps (#) 30 30 30 --   Time    --   ROM (degrees) 0-42 0-42 0-42 0-42   Pain None None none None   Flex:Ext ratio    2.77:1   Cervical Parameters       Top Dead Center (TDC)       Counterbalance (CB)       Seat Height       Week/Visit       Enter Week/Visit #       Weight (lbs)       Reps (#)       Time       ROM (degrees)       Pain       Flex:Ext ratio         Date 3/17/22 3/14/22 3/10/22 3/1/22   Exercise       Supine PPTs   X 10  X 10, 5 sec holds   Sidelying clamshells   Verbally discussed X 10 B, orange band   Supine LTRs   X 10 B X 10 B   Supine SKTC stretch     X 30 sec holds each leg    NuStep   X 4 min    Rotary Torso 36# to the R 32# to the R  30# to the R    S/l bow and arrow   X 10 B     Supine Marching   X 10 B     Supine hamstring stretch  X 30 sec holds B      Supine pretzel   X 30 sec holds                     Scott Parmer, PT, DPT

## 2022-03-21 ENCOUNTER — HOSPITAL ENCOUNTER (OUTPATIENT)
Dept: PHYSICAL THERAPY | Facility: CLINIC | Age: 72
Discharge: HOME | End: 2022-03-21
Attending: PHYSICIAN ASSISTANT
Payer: COMMERCIAL

## 2022-03-21 DIAGNOSIS — M54.41 CHRONIC RIGHT-SIDED LOW BACK PAIN WITH RIGHT-SIDED SCIATICA: Primary | ICD-10-CM

## 2022-03-21 DIAGNOSIS — M47.816 LUMBAR FACET ARTHROPATHY: ICD-10-CM

## 2022-03-21 DIAGNOSIS — G89.29 CHRONIC RIGHT-SIDED LOW BACK PAIN WITH RIGHT-SIDED SCIATICA: Primary | ICD-10-CM

## 2022-03-21 DIAGNOSIS — M54.16 LUMBAR RADICULAR PAIN: ICD-10-CM

## 2022-03-21 PROCEDURE — 97110 THERAPEUTIC EXERCISES: CPT | Mod: GP

## 2022-03-21 NOTE — PROGRESS NOTES
Subjective:   Patient is doing well. Slight soreness today from doing house and yard work this weekend in preparation for the spring time. Has not had spasms since last session.     Assessment   Ankush presents to PT for his fourth MedX follow up. Reports compliance with the HEP thus far. Tolerated MedX strengthening well today with no additional pain. Tolerated progressive leg strengthening with reformer exercises well today. Remains appropriate for skilled PT services.     Lumbar MedX Initial testing    AROM (full=  0-72  lumbar) 0-42   Max Extension Torque  277   Flex: ext ratio (ideal 1.4:1) 2.77:1         Date 3/21/22 3/17/22 3/14/22 3/10/22 3/1/22   Lumbar Parameters        Top Dead Center (TDC) 18 18 18 18 18   Counterbalance (CB) 450 450 450 450 450   Seat Pad 1 1 1 1 1   Femur Restraint 5 5 5 5 5   Week/Visit        Enter Week/Visit # Wk 3 V 1  W2V2 W2V1 W1V2 W1V1   Weight (lbs) 145# 136# 130# 120#  277# (Max)    Reps (#) 35 30 30 30 --   Time 110    --   ROM (degrees) 0-42 0-42 0-42 0-42 0-42   Pain None None None none None   Flex:Ext ratio     2.77:1   Cervical Parameters        Top Dead Center (TDC)        Counterbalance (CB)        Seat Height        Week/Visit        Enter Week/Visit #        Weight (lbs)        Reps (#)        Time        ROM (degrees)        Pain        Flex:Ext ratio          Date 3/21/22 3/17/22 3/14/22 3/10/22 3/1/22   Exercise        Supine PPTs    X 10  X 10, 5 sec holds   Sidelying clamshells    Verbally discussed X 10 B, orange band   Supine LTRs    X 10 B X 10 B   Supine SKTC stretch      X 30 sec holds each leg    NuStep X 5 min    X 4 min    Rotary Torso 38# to the R  36# to the R 32# to the R  30# to the R    S/l bow and arrow    X 10 B     Supine Marching    X 10 B     Supine hamstring stretch   X 30 sec holds B      Supine pretzel    X 30 sec holds     Reformer Leg Press DL X 15 all   SL X 8 all        Reformer pull downs X 15 1R 1 B          Scott Parmer, PT, DPT

## 2022-03-24 ENCOUNTER — OFFICE VISIT (OUTPATIENT)
Dept: PHYSICAL MEDICINE AND REHAB | Facility: CLINIC | Age: 72
End: 2022-03-24
Payer: COMMERCIAL

## 2022-03-24 ENCOUNTER — HOSPITAL ENCOUNTER (OUTPATIENT)
Dept: PHYSICAL THERAPY | Facility: CLINIC | Age: 72
Discharge: HOME | End: 2022-03-24
Payer: COMMERCIAL

## 2022-03-24 VITALS — HEART RATE: 94 BPM | DIASTOLIC BLOOD PRESSURE: 82 MMHG | SYSTOLIC BLOOD PRESSURE: 152 MMHG | OXYGEN SATURATION: 95 %

## 2022-03-24 DIAGNOSIS — M48.061 LUMBAR FORAMINAL STENOSIS: Primary | ICD-10-CM

## 2022-03-24 DIAGNOSIS — M47.816 LUMBAR FACET ARTHROPATHY: ICD-10-CM

## 2022-03-24 DIAGNOSIS — G89.29 CHRONIC RIGHT-SIDED LOW BACK PAIN WITH RIGHT-SIDED SCIATICA: Primary | ICD-10-CM

## 2022-03-24 DIAGNOSIS — M54.16 LUMBAR RADICULAR PAIN: ICD-10-CM

## 2022-03-24 DIAGNOSIS — M54.41 CHRONIC RIGHT-SIDED LOW BACK PAIN WITH RIGHT-SIDED SCIATICA: Primary | ICD-10-CM

## 2022-03-24 PROCEDURE — 99213 OFFICE O/P EST LOW 20 MIN: CPT | Performed by: PHYSICIAN ASSISTANT

## 2022-03-24 PROCEDURE — 97110 THERAPEUTIC EXERCISES: CPT | Mod: GP

## 2022-03-24 ASSESSMENT — PAIN SCALES - GENERAL: PAINLEVEL: MILD PAIN (2)

## 2022-03-24 NOTE — LETTER
3/24/2022         RE: Ankush Zaidi  [address]  [place] [facility] [address]        Dear Colleague,    Thank you for referring your patient, Ankush Zaidi, to the [facility] [place]. Please see a copy of my visit note below.    Assessment:   Ankush Zaidi is a 71 year old y.o. male with past medical history significant for sleep apnea, GERD, type 2 diabetes mellitus, hyperlipidemia  who presents today for follow-up regarding  chronic intermittent right low back pain.  Patient previously had intermittent  radiation into the right lower extremity with associated numbness and tingling, he denies any recent lower extremity radicular symptoms.  My review of an MRI lumbar spine shows moderate disc degeneration and moderate facet arthropathy causing moderate bilateral foraminal stenosis.  There is trace degenerative spondylolisthesis at this level.  Patient has had 5 sessions of MedX physical therapy with 50% improvement in his pain.  Patient is neurologically intact.       Plan:     A shared decision making plan was used.  The patient's values and choices were respected.  The following represents what was discussed and decided upon by the physician assistant and the patient.      1.  DIAGNOSTIC TESTS: I reviewed the MRI lumbar spine.  No further diagnostic tests were ordered.    2.  PHYSICAL THERAPY: Patient is currently in MedX physical therapy.  He has had 5 sessions of far.  Encouraged to continue with physical therapy and the home exercises.    3.  MEDICATIONS:  No changes are made to the patient's medications.  -Patient takes gabapentin 600 mg 3 times daily.  This is actually prescribed for post herpetic neuralgia but could be helping with lumbar radicular pain as well.  -He takes Tylenol and Aleve as needed.  -Topical medications have not been helpful.    4.  INTERVENTIONS: No interventions were ordered.  He is making good progress with physical therapy.  -If he fails to improve,  we could try a right L4-5 transforaminal epidural steroid injection.  -If that did not help, I recommend medial branch blocks as a work-up toward radiofrequency ablation.    5.  PATIENT EDUCATION: Patient is in agreement the above plan.  All questions were answered.    6.  FOLLOW-UP: Patient will follow up with me as needed.  If he has questions or concerns, he should not hesitate to call.    Subjective:     Ankush Zaidi is a 71 year old male who presents today for follow-up regarding chronic intermittent right low back pain with radiation into the right lower extremity with associated numbness and tingling.  I last saw the patient February 10, 2022.  I referred the patient MedX physical therapy.  He has had 5 sessions.  Patient reports 50% improvement in his pain.    Patient complains of more mild residual right-sided low back pain.  Pain comes and goes.  He denies any pain radiating into the right leg recently.  He does have some numbness and tingling in the low back in the same distribution as his pain.  He rates his pain today as a 2 out of 10.  At its worst it is an 8 out of 10.  At its best it is a 5-10.  Pain is triggered by twisting to clean himself after having a bowel movement.      Patient is currently in MedX physical therapy.  He has had 5 sessions.  He does his home exercises.  He has tried 5 or 6 sessions of chiropractic treatment which stopped helping.  He is currently taking gabapentin for postherpetic neuralgia.  He takes 600 mg 3 times daily.  Uses Tylenol and Aleve as needed which are helpful.    Review of Systems:  Positive for numbness/tingling.  Negative for weakness, loss of bowel/bladder control, inability to urinate, headache, pain much worse at night, trip/stumble/falls, difficulty swallowing, difficulty with hand skills, fevers, unintentional weight loss.     Objective:   CONSTITUTIONAL:  Vital signs as above.  No acute distress.  The patient is well nourished and well groomed.     PSYCHIATRIC:  The patient is awake, alert, oriented to person, place and time.  The patient is answering questions appropriately with clear speech.  Normal affect.  HEENT: Normocephalic, atraumatic.  Sclera clear.    SKIN:  Skin over the face, posterior torso, bilateral upper and lower extremities is clean, dry, intact without rashes.  VASCULAR: No significant lower extremity edema.  MUSCULOSKELETAL:  Gait is non-antalgic.  The patient is able to heel and toe walk without any difficulty.  The patient has 5/5 strength for the bilateral hip flexors, knee flexors/extensors, ankle dorsiflexors/plantar flexors.  NEUROLOGICAL:   Sensation to light touch is intact in the bilateral L4, L5, and S1 dermatomes.       RESULTS: I reviewed the MRI lumbar spine from [facility] dated February 7, 2022.  At L4-5 there is moderate disc degeneration and moderate bilateral facet degeneration.  There is trace degenerative spondylolisthesis.  There is moderate bilateral foraminal stenosis at this level.  At L3-4 there is moderate disc degeneration and minimal facet degeneration with mild right and mild to moderate left foraminal stenosis.  Please see report for further details.          Again, thank you for allowing me to participate in the care of your patient.        Sincerely,        Jeny Jones PA-C

## 2022-03-24 NOTE — PATIENT INSTRUCTIONS
I am so happy that you are feeling better.  Keep up the great work with physical therapy.    If your pain flares back up or if your pain keeps you from being able to participate in golf and bowling please call and schedule a follow-up visit with me.  We can discuss injection options for pain management.

## 2022-03-24 NOTE — PROGRESS NOTES
Assessment:   Ankush Zaidi is a 71 year old y.o. male with past medical history significant for sleep apnea, GERD, type 2 diabetes mellitus, hyperlipidemia  who presents today for follow-up regarding  chronic intermittent right low back pain.  Patient previously had intermittent  radiation into the right lower extremity with associated numbness and tingling, he denies any recent lower extremity radicular symptoms.  My review of an MRI lumbar spine shows moderate disc degeneration and moderate facet arthropathy causing moderate bilateral foraminal stenosis.  There is trace degenerative spondylolisthesis at this level.  Patient has had 5 sessions of MedX physical therapy with 50% improvement in his pain.  Patient is neurologically intact.       Plan:     A shared decision making plan was used.  The patient's values and choices were respected.  The following represents what was discussed and decided upon by the physician assistant and the patient.      1.  DIAGNOSTIC TESTS: I reviewed the MRI lumbar spine.  No further diagnostic tests were ordered.    2.  PHYSICAL THERAPY: Patient is currently in MedX physical therapy.  He has had 5 sessions of far.  Encouraged to continue with physical therapy and the home exercises.    3.  MEDICATIONS:  No changes are made to the patient's medications.  -Patient takes gabapentin 600 mg 3 times daily.  This is actually prescribed for post herpetic neuralgia but could be helping with lumbar radicular pain as well.  -He takes Tylenol and Aleve as needed.  -Topical medications have not been helpful.    4.  INTERVENTIONS: No interventions were ordered.  He is making good progress with physical therapy.  -If he fails to improve, we could try a right L4-5 transforaminal epidural steroid injection.  -If that did not help, I recommend medial branch blocks as a work-up toward radiofrequency ablation.    5.  PATIENT EDUCATION: Patient is in agreement the above plan.  All questions were  answered.    6.  FOLLOW-UP: Patient will follow up with me as needed.  If he has questions or concerns, he should not hesitate to call.    Subjective:     Ankush Zaidi is a 71 year old male who presents today for follow-up regarding chronic intermittent right low back pain with radiation into the right lower extremity with associated numbness and tingling.  I last saw the patient February 10, 2022.  I referred the patient MedX physical therapy.  He has had 5 sessions.  Patient reports 50% improvement in his pain.    Patient complains of more mild residual right-sided low back pain.  Pain comes and goes.  He denies any pain radiating into the right leg recently.  He does have some numbness and tingling in the low back in the same distribution as his pain.  He rates his pain today as a 2 out of 10.  At its worst it is an 8 out of 10.  At its best it is a 5-10.  Pain is triggered by twisting to clean himself after having a bowel movement.      Patient is currently in MedX physical therapy.  He has had 5 sessions.  He does his home exercises.  He has tried 5 or 6 sessions of chiropractic treatment which stopped helping.  He is currently taking gabapentin for postherpetic neuralgia.  He takes 600 mg 3 times daily.  Uses Tylenol and Aleve as needed which are helpful.    Review of Systems:  Positive for numbness/tingling.  Negative for weakness, loss of bowel/bladder control, inability to urinate, headache, pain much worse at night, trip/stumble/falls, difficulty swallowing, difficulty with hand skills, fevers, unintentional weight loss.     Objective:   CONSTITUTIONAL:  Vital signs as above.  No acute distress.  The patient is well nourished and well groomed.    PSYCHIATRIC:  The patient is awake, alert, oriented to person, place and time.  The patient is answering questions appropriately with clear speech.  Normal affect.  HEENT: Normocephalic, atraumatic.  Sclera clear.    SKIN:  Skin over the face, posterior torso,  bilateral upper and lower extremities is clean, dry, intact without rashes.  VASCULAR: No significant lower extremity edema.  MUSCULOSKELETAL:  Gait is non-antalgic.  The patient is able to heel and toe walk without any difficulty.  The patient has 5/5 strength for the bilateral hip flexors, knee flexors/extensors, ankle dorsiflexors/plantar flexors.  NEUROLOGICAL:   Sensation to light touch is intact in the bilateral L4, L5, and S1 dermatomes.       RESULTS: I reviewed the MRI lumbar spine from [facility] dated February 7, 2022.  At L4-5 there is moderate disc degeneration and moderate bilateral facet degeneration.  There is trace degenerative spondylolisthesis.  There is moderate bilateral foraminal stenosis at this level.  At L3-4 there is moderate disc degeneration and minimal facet degeneration with mild right and mild to moderate left foraminal stenosis.  Please see report for further details.

## 2022-03-24 NOTE — PROGRESS NOTES
Subjective:   Patient is doing well. No pain today - just some stiffness. Saw referring provider earlier today which went well.     Assessment   Ankush presents to PT for his fifth MedX follow up. Reports compliance with the HEP thus far. Tolerated MedX strengthening well today with no additional pain. Tolerated progressive leg strengthening with reformer exercises well today. Remains appropriate for skilled PT services.     Lumbar MedX Initial testing    AROM (full=  0-72  lumbar) 0-42   Max Extension Torque  277   Flex: ext ratio (ideal 1.4:1) 2.77:1         Date 3/24/22 3/21/22 3/17/22 3/14/22 3/10/22 3/1/22   Lumbar Parameters         Top Dead Center (TDC) 18 18 18 18 18 18   Counterbalance (CB) 450 450 450 450 450 450   Seat Pad 1 1 1 1 1 1   Femur Restraint 5 5 5 5 5 5   Week/Visit         Enter Week/Visit # Wk 3 V 2  Wk 3 V 1  W2V2 W2V1 W1V2 W1V1   Weight (lbs) 155# 145# 136# 130# 120#  277# (Max)    Reps (#) 30 35 30 30 30 --   Time  110    --   ROM (degrees) 0-42 0-42 0-42 0-42 0-42 0-42   Pain None  None None None none None   Flex:Ext ratio      2.77:1   Cervical Parameters         Top Dead Center (TDC)         Counterbalance (CB)         Seat Height         Week/Visit         Enter Week/Visit #         Weight (lbs)         Reps (#)         Time         ROM (degrees)         Pain         Flex:Ext ratio           Date 3/24/22  3/21/22 3/17/22 3/14/22 3/10/22 3/1/22   Exercise         Supine PPTs     X 10  X 10, 5 sec holds   Sidelying clamshells     Verbally discussed X 10 B, orange band   Supine LTRs     X 10 B X 10 B   Supine SKTC stretch       X 30 sec holds each leg    NuStep X 5 min X 5 min    X 4 min    Rotary Torso 40# to the R  38# to the R  36# to the R 32# to the R  30# to the R    S/l bow and arrow     X 10 B     Supine bridges X 10 B        Supine Marching  X 20    X 10 B     Supine hamstring stretch    X 30 sec holds B      Supine pretzel  X 30 sec holds   X 30 sec holds     Reformer Leg Press   DL X 15 all   SL X 8 all        Reformer pull downs  X 15 1R 1 B          Scott Parmer, PT, DPT

## 2022-03-28 ENCOUNTER — HOSPITAL ENCOUNTER (OUTPATIENT)
Dept: PHYSICAL THERAPY | Facility: CLINIC | Age: 72
Discharge: HOME | End: 2022-03-28
Payer: COMMERCIAL

## 2022-03-28 DIAGNOSIS — G89.29 CHRONIC RIGHT-SIDED LOW BACK PAIN WITH RIGHT-SIDED SCIATICA: Primary | ICD-10-CM

## 2022-03-28 DIAGNOSIS — M54.16 LUMBAR RADICULAR PAIN: ICD-10-CM

## 2022-03-28 DIAGNOSIS — M47.816 LUMBAR FACET ARTHROPATHY: ICD-10-CM

## 2022-03-28 DIAGNOSIS — M54.41 CHRONIC RIGHT-SIDED LOW BACK PAIN WITH RIGHT-SIDED SCIATICA: Primary | ICD-10-CM

## 2022-03-28 PROCEDURE — 97110 THERAPEUTIC EXERCISES: CPT | Mod: GP

## 2022-03-28 NOTE — PROGRESS NOTES
"Subjective:   Patient is doing well. No pain today - just some stiffness. Getting \"better each day\".     Assessment   Ankush presents to PT for his seventh MedX follow up. Reports compliance with the HEP thus far. Tolerated MedX strengthening well today with no additional pain. Tolerated progressive leg strengthening with reformer exercises well today. Remains appropriate for skilled PT services.     Lumbar MedX Initial testing    AROM (full=  0-72  lumbar) 0-42   Max Extension Torque  277   Flex: ext ratio (ideal 1.4:1) 2.77:1         Date 3/28/22 3/24/22 3/21/22 3/17/22 3/14/22 3/10/22 3/1/22   Lumbar Parameters          Top Dead Center (TDC) 18 18 18 18 18 18 18   Counterbalance (CB) 450 450 450 450 450 450 450   Seat Pad 1 1 1 1 1 1 1   Femur Restraint 5 5 5 5 5 5 5   Week/Visit          Enter Week/Visit # Wk 4 V 1 Wk 3 V 2  Wk 3 V 1  W2V2 W2V1 W1V2 W1V1   Weight (lbs) 163# 155# 145# 136# 130# 120#  277# (Max)    Reps (#) 30 30 35 30 30 30 --   Time 115  110    --   ROM (degrees) 0-42 0-42 0-42 0-42 0-42 0-42 0-42   Pain fatigue None  None None None none None   Flex:Ext ratio       2.77:1   Cervical Parameters          Top Dead Center (TDC)          Counterbalance (CB)          Seat Height          Week/Visit          Enter Week/Visit #          Weight (lbs)          Reps (#)          Time          ROM (degrees)          Pain          Flex:Ext ratio            Date 3/28/22 3/24/22  3/21/22 3/17/22 3/14/22 3/10/22 3/1/22   Exercise          Supine PPTs      X 10  X 10, 5 sec holds   Sidelying clamshells      Verbally discussed X 10 B, orange band   Supine LTRs      X 10 B X 10 B   Supine SKTC stretch        X 30 sec holds each leg    NuStep X 6 min X 5 min X 5 min    X 4 min    Rotary Torso 42# to the L  40# to the R  38# to the R  36# to the R 32# to the R  30# to the R    S/l bow and arrow      X 10 B     Supine bridges  X 10 B        Supine Marching   X 20    X 10 B     Supine hamstring stretch     X 30 sec " holds B      Supine pretzel   X 30 sec holds   X 30 sec holds     Reformer bridge X 10   X 8 with leg press all         Reformer Leg Press DL X 15 all  SL X 10 all  DL X 15 all   SL X 8 all        Reformer pull downs   X 15 1R 1 B          Scott Parmer, PT, DPT

## 2022-03-31 ENCOUNTER — HOSPITAL ENCOUNTER (OUTPATIENT)
Dept: PHYSICAL THERAPY | Facility: CLINIC | Age: 72
Discharge: HOME | End: 2022-03-31
Payer: COMMERCIAL

## 2022-03-31 DIAGNOSIS — M54.16 LUMBAR RADICULAR PAIN: ICD-10-CM

## 2022-03-31 DIAGNOSIS — G89.29 CHRONIC RIGHT-SIDED LOW BACK PAIN WITH RIGHT-SIDED SCIATICA: Primary | ICD-10-CM

## 2022-03-31 DIAGNOSIS — M47.816 LUMBAR FACET ARTHROPATHY: ICD-10-CM

## 2022-03-31 DIAGNOSIS — M54.41 CHRONIC RIGHT-SIDED LOW BACK PAIN WITH RIGHT-SIDED SCIATICA: Primary | ICD-10-CM

## 2022-03-31 PROCEDURE — 97110 THERAPEUTIC EXERCISES: CPT | Mod: GP

## 2022-03-31 NOTE — PROGRESS NOTES
Subjective:   Patient is doing well. No pain today - just some stiffness. Felt a little more stiff yesterday than usual, but better today.  Assessment   Ankush presents to PT for his seventh MedX follow up. Reports compliance with the HEP thus far. Tolerated MedX strengthening well today with no additional pain. Tolerated progressive leg strengthening with reformer exercises well today. Demonstrated improved lumbar isometric testing this date and an improved FRANKLIN score.  Remains appropriate for skilled PT services.     Lumbar MedX Re-test Initial testing    AROM (full=  0-72  lumbar) 0-42 0-42   Max Extension Torque   277   Flex: ext ratio (ideal 1.4:1) 1.69:1 2.77:1         Date 3/31/22 3/28/22 3/24/22 3/21/22 3/17/22 3/14/22 3/10/22 3/1/22   Lumbar Parameters           Top Dead Center (TDC) 18 18 18 18 18 18 18 18   Counterbalance (CB) 450 450 450 450 450 450 450 450   Seat Pad 1 1 1 1 1 1 1 1   Femur Restraint 5 5 5 5 5 5 5 5   Week/Visit           Enter Week/Visit # Wk 4 V 2  Wk 4 V 1 Wk 3 V 2  Wk 3 V 1  W2V2 W2V1 W1V2 W1V1   Weight (lbs) 168# 163# 155# 145# 136# 130# 120#  277# (Max)    Reps (#) 30 30 30 35 30 30 30 --   Time 72 115  110    --   ROM (degrees) 0-42 0-42 0-42 0-42 0-42 0-42 0-42 0-42   Pain  fatigue None  None None None none None   Flex:Ext ratio 1.69:1       2.77:1   Cervical Parameters           Top Dead Center (TDC)           Counterbalance (CB)           Seat Height           Week/Visit           Enter Week/Visit #           Weight (lbs)           Reps (#)           Time           ROM (degrees)           Pain           Flex:Ext ratio             Date 3/31/22 3/28/22 3/24/22  3/21/22 3/17/22 3/14/22 3/10/22 3/1/22   Exercise           Supine PPTs       X 10  X 10, 5 sec holds   Sidelying clamshells       Verbally discussed X 10 B, orange band   Supine LTRs       X 10 B X 10 B   Supine SKTC stretch         X 30 sec holds each leg    NuStep X 5 min X 6 min X 5 min X 5 min    X 4 min    Rotary Torso  42# to the R  42# to the L  40# to the R  38# to the R  36# to the R 32# to the R  30# to the R    S/l bow and arrow       X 10 B     Supine bridges   X 10 B        Supine Marching    X 20    X 10 B     Supine hamstring stretch      X 30 sec holds B      Supine pretzel    X 30 sec holds   X 30 sec holds     Reformer bridge  X 10   X 8 with leg press all         Reformer ab rotation X 8 B 1 R 1 G          Reformer Leg Press DL X 15 all  SL X 10 all DL X 15 all  SL X 10 all  DL X 15 all   SL X 8 all        Reformer pull downs    X 15 1R 1 B          Scott Parmer, PT, DPT

## 2022-04-04 ENCOUNTER — HOSPITAL ENCOUNTER (OUTPATIENT)
Dept: PHYSICAL THERAPY | Facility: CLINIC | Age: 72
Discharge: HOME | End: 2022-04-04
Payer: COMMERCIAL

## 2022-04-04 DIAGNOSIS — M47.816 LUMBAR FACET ARTHROPATHY: ICD-10-CM

## 2022-04-04 DIAGNOSIS — M54.16 LUMBAR RADICULAR PAIN: ICD-10-CM

## 2022-04-04 DIAGNOSIS — M54.41 CHRONIC RIGHT-SIDED LOW BACK PAIN WITH RIGHT-SIDED SCIATICA: Primary | ICD-10-CM

## 2022-04-04 DIAGNOSIS — G89.29 CHRONIC RIGHT-SIDED LOW BACK PAIN WITH RIGHT-SIDED SCIATICA: Primary | ICD-10-CM

## 2022-04-04 PROCEDURE — 97110 THERAPEUTIC EXERCISES: CPT | Mod: GP

## 2022-04-04 NOTE — PROGRESS NOTES
Subjective:   Patient is doing well. Continued stiffness but no pain. Still doing the exercises.      Assessment:  Ankush presents to PT for his eighth MedX follow up. Reports compliance with the HEP thus far. Tolerated MedX strengthening well today with no additional pain. Tolerated progressive leg strengthening with reformer exercises well today. Remains appropriate for skilled PT services.     Lumbar MedX Re-test Initial testing    AROM (full=  0-72  lumbar) 0-42 0-42   Max Extension Torque   277   Flex: ext ratio (ideal 1.4:1) 1.69:1 2.77:1         Date 4/4/22 3/31/22 3/28/22 3/24/22 3/21/22 3/17/22 3/14/22 3/10/22 3/1/22   Lumbar Parameters            Top Dead Center (TDC) 18 18 18 18 18 18 18 18 18   Counterbalance (CB) 450 450 450 450 450 450 450 450 450   Seat Pad 1 1 1 1 1 1 1 1 1   Femur Restraint 5 5 5 5 5 5 5 5 5   Week/Visit            Enter Week/Visit # Wk 5 Wk 4 V 2  Wk 4 V 1 Wk 3 V 2  Wk 3 V 1  W2V2 W2V1 W1V2 W1V1   Weight (lbs) 174# 168# 163# 155# 145# 136# 130# 120#  277# (Max)    Reps (#) 30 30 30 30 35 30 30 30 --   Time 78 72 115  110    --   ROM (degrees) 0-42 0-42 0-42 0-42 0-42 0-42 0-42 0-42 0-42   Pain   fatigue None  None None None none None   Flex:Ext ratio  1.69:1       2.77:1   Cervical Parameters            Top Dead Center (TDC)            Counterbalance (CB)            Seat Height            Week/Visit            Enter Week/Visit #            Weight (lbs)            Reps (#)            Time            ROM (degrees)            Pain            Flex:Ext ratio              Date 4/4/22 3/31/22 3/28/22 3/24/22  3/21/22 3/17/22 3/14/22 3/10/22 3/1/22   Exercise            Supine PPTs        X 10  X 10, 5 sec holds   Sidelying clamshells        Verbally discussed X 10 B, orange band   Supine LTRs        X 10 B X 10 B   Supine SKTC stretch          X 30 sec holds each leg    NuStep X 4 min X 5 min X 6 min X 5 min X 5 min    X 4 min    Rotary Torso 42# to the R 42# to the R  42# to the L  40# to  the R  38# to the R  36# to the R 32# to the R  30# to the R    S/l bow and arrow        X 10 B     Supine bridges    X 10 B        Supine Marching     X 20    X 10 B     Supine hamstring stretch       X 30 sec holds B      Supine pretzel     X 30 sec holds   X 30 sec holds     Reformer bridge X 10 with leg press all  X 10   X 8 with leg press all         Reformer ab rotation  X 8 B 1 R 1 G          Reformer Leg Press DL X 15 all  SL X 12 all  DL X 15 all  SL X 10 all DL X 15 all  SL X 10 all  DL X 15 all   SL X 8 all        Reformer pull downs     X 15 1R 1 B          Scott Parmer, PT, DPT

## 2022-04-05 DIAGNOSIS — M54.41 CHRONIC RIGHT-SIDED LOW BACK PAIN WITH RIGHT-SIDED SCIATICA: ICD-10-CM

## 2022-04-05 DIAGNOSIS — G89.29 CHRONIC RIGHT-SIDED LOW BACK PAIN WITH RIGHT-SIDED SCIATICA: ICD-10-CM

## 2022-04-05 RX ORDER — GABAPENTIN 300 MG/1
600 CAPSULE ORAL 3 TIMES DAILY
Qty: 135 CAPSULE | Refills: 1 | Status: SHIPPED | OUTPATIENT
Start: 2022-04-05 | End: 2022-08-24

## 2022-04-05 NOTE — TELEPHONE ENCOUNTER
Patient calling to request refill of his Gabapentin.  Also stating that he is doing well with this medication and is requesting just a one month supply.  Stating the 270 is not needed at this time and he would be happy with half that amount if Dr Curry agrees with his request.    Any questions, patient can be reached at [phone].

## 2022-04-11 ENCOUNTER — HOSPITAL ENCOUNTER (OUTPATIENT)
Dept: PHYSICAL THERAPY | Facility: CLINIC | Age: 72
Discharge: HOME | End: 2022-04-11
Payer: COMMERCIAL

## 2022-04-11 DIAGNOSIS — M54.41 CHRONIC RIGHT-SIDED LOW BACK PAIN WITH RIGHT-SIDED SCIATICA: Primary | ICD-10-CM

## 2022-04-11 DIAGNOSIS — G89.29 CHRONIC RIGHT-SIDED LOW BACK PAIN WITH RIGHT-SIDED SCIATICA: Primary | ICD-10-CM

## 2022-04-11 DIAGNOSIS — M47.816 LUMBAR FACET ARTHROPATHY: ICD-10-CM

## 2022-04-11 DIAGNOSIS — M54.16 LUMBAR RADICULAR PAIN: ICD-10-CM

## 2022-04-11 PROCEDURE — 97110 THERAPEUTIC EXERCISES: CPT | Mod: GP

## 2022-04-11 NOTE — PROGRESS NOTES
Subjective:   Patient is doing well. Continued stiffness but no pain. Still doing the exercises.      Assessment:  Ankush presents to PT for his ninth MedX follow up. Reports compliance with the HEP thus far. Tolerated MedX strengthening well today with no additional pain. Tolerated progressive leg strengthening with reformer exercises well today. Remains appropriate for skilled PT services.     Lumbar MedX Re-test Initial testing    AROM (full=  0-72  lumbar) 0-42 0-42   Max Extension Torque   277   Flex: ext ratio (ideal 1.4:1) 1.69:1 2.77:1         Date 4/11/2022 4/4/22 3/31/22 3/28/22 3/24/22 3/21/22 3/17/22 3/14/22 3/10/22 3/1/22   Lumbar Parameters             Top Dead Center (TDC) 18 18 18 18 18 18 18 18 18 18   Counterbalance (CB) 450 450 450 450 450 450 450 450 450 450   Seat Pad 1 1 1 1 1 1 1 1 1 1   Femur Restraint 5 5 5 5 5 5 5 5 5 5   Week/Visit             Enter Week/Visit # Wk 6  Wk 5 Wk 4 V 2  Wk 4 V 1 Wk 3 V 2  Wk 3 V 1  W2V2 W2V1 W1V2 W1V1   Weight (lbs) 180# (keep weight next time) 174# 168# 163# 155# 145# 136# 130# 120#  277# (Max)    Reps (#) 20 30 30 30 30 35 30 30 30 --   Time 82 78 72 115  110    --   ROM (degrees) 0-42 0-42 0-42 0-42 0-42 0-42 0-42 0-42 0-42 0-42   Pain Fatigue - keep this weight    fatigue None  None None None none None   Flex:Ext ratio   1.69:1       2.77:1   Cervical Parameters             Top Dead Center (TDC)             Counterbalance (CB)             Seat Height             Week/Visit             Enter Week/Visit #             Weight (lbs)             Reps (#)             Time             ROM (degrees)             Pain             Flex:Ext ratio               Date 4/11/22 4/4/22 3/31/22 3/28/22 3/24/22  3/21/22 3/17/22 3/14/22 3/10/22 3/1/22   Exercise             Supine PPTs         X 10  X 10, 5 sec holds   Sidelying clamshells         Verbally discussed X 10 B, orange band   Supine LTRs         X 10 B X 10 B   Supine SKTC stretch           X 30 sec holds each leg     NuStep X 5 min X 4 min X 5 min X 6 min X 5 min X 5 min    X 4 min    Rotary Torso 42# to L  42# to the R 42# to the R  42# to the L  40# to the R  38# to the R  36# to the R 32# to the R  30# to the R    S/l bow and arrow         X 10 B     Supine bridges     X 10 B        Supine Marching      X 20    X 10 B     Supine hamstring stretch        X 30 sec holds B      Supine pretzel      X 30 sec holds   X 30 sec holds     Reformer bridge X 15 with leg press all X 10 with leg press all  X 10   X 8 with leg press all         Reformer ab rotation   X 8 B 1 R 1 G          Reformer Leg Press DL X 15 all  SL X 12 all DL X 15 all  SL X 12 all  DL X 15 all  SL X 10 all DL X 15 all  SL X 10 all  DL X 15 all   SL X 8 all        Reformer pull downs      X 15 1R 1 B          Scott Parmer, PT, DPT

## 2022-04-21 ENCOUNTER — HOSPITAL ENCOUNTER (OUTPATIENT)
Dept: PHYSICAL THERAPY | Facility: CLINIC | Age: 72
Discharge: HOME | End: 2022-04-21
Payer: COMMERCIAL

## 2022-04-21 DIAGNOSIS — M54.41 CHRONIC RIGHT-SIDED LOW BACK PAIN WITH RIGHT-SIDED SCIATICA: Primary | ICD-10-CM

## 2022-04-21 DIAGNOSIS — M47.816 LUMBAR FACET ARTHROPATHY: ICD-10-CM

## 2022-04-21 DIAGNOSIS — M54.16 LUMBAR RADICULAR PAIN: ICD-10-CM

## 2022-04-21 DIAGNOSIS — G89.29 CHRONIC RIGHT-SIDED LOW BACK PAIN WITH RIGHT-SIDED SCIATICA: Primary | ICD-10-CM

## 2022-04-21 PROCEDURE — 97110 THERAPEUTIC EXERCISES: CPT | Mod: GP

## 2022-04-21 PROCEDURE — 97140 MANUAL THERAPY 1/> REGIONS: CPT | Mod: GP

## 2022-04-21 NOTE — PROGRESS NOTES
Subjective:   Patient has had a bit of a setback over the last week or so. Experienced back spasms on the R side last week on Tuesday and has since had back pain from the neck down to the low back on the R side. Is doing better today than he was.      Assessment:  Ankush presents to PT for his 10th MedX follow up. Reports compliance with the HEP thus far. Had a flare up with spasms. Focused on manual therapy and home stretching exercises and will continue to monitor response to this. Remains appropriate for skilled PT services.     Lumbar MedX Re-test Initial testing    AROM (full=  0-72  lumbar) 0-42 0-42   Max Extension Torque   277   Flex: ext ratio (ideal 1.4:1) 1.69:1 2.77:1         Date 4/21/22  MEDX HELD TODAY 4/11/2022 4/4/22 3/31/22 3/28/22 3/24/22 3/21/22 3/17/22 3/14/22 3/10/22 3/1/22   Lumbar Parameters              Top Dead Center (TDC)  18 18 18 18 18 18 18 18 18 18   Counterbalance (CB)  450 450 450 450 450 450 450 450 450 450   Seat Pad  1 1 1 1 1 1 1 1 1 1   Femur Restraint  5 5 5 5 5 5 5 5 5 5   Week/Visit              Enter Week/Visit #  Wk 6  Wk 5 Wk 4 V 2  Wk 4 V 1 Wk 3 V 2  Wk 3 V 1  W2V2 W2V1 W1V2 W1V1   Weight (lbs)  180# (keep weight next time) 174# 168# 163# 155# 145# 136# 130# 120#  277# (Max)    Reps (#)  20 30 30 30 30 35 30 30 30 --   Time  82 78 72 115  110    --   ROM (degrees)  0-42 0-42 0-42 0-42 0-42 0-42 0-42 0-42 0-42 0-42   Pain  Fatigue - keep this weight    fatigue None  None None None none None   Flex:Ext ratio    1.69:1       2.77:1   Cervical Parameters              Top Dead Center (TDC)              Counterbalance (CB)              Seat Height              Week/Visit              Enter Week/Visit #              Weight (lbs)              Reps (#)              Time              ROM (degrees)              Pain              Flex:Ext ratio                Date 4/21 4/11/22 4/4/22 3/31/22 3/28/22 3/24/22  3/21/22 3/17/22 3/14/22 3/10/22 3/1/22   Exercise              Supine PPTs  Verbal discussion         X 10  X 10, 5 sec holds   Sidelying clamshells          Verbally discussed X 10 B, orange band   Supine LTRs Verbal discussion         X 10 B X 10 B   Supine SKTC stretch Verbal discussion           X 30 sec holds each leg    NuStep X 4 min X 5 min X 4 min X 5 min X 6 min X 5 min X 5 min    X 4 min    Rotary Torso  42# to L  42# to the R 42# to the R  42# to the L  40# to the R  38# to the R  36# to the R 32# to the R  30# to the R    S/l bow and arrow Verbal discussion         X 10 B     Supine bridges      X 10 B        Supine Marching       X 20    X 10 B     Supine hamstring stretch Verbal discussion        X 30 sec holds B      Seated UT stretch X 1 min holds             Supine pretzel  Verbal discussion     X 30 sec holds   X 30 sec holds     Reformer bridge  X 15 with leg press all X 10 with leg press all  X 10   X 8 with leg press all         Reformer ab rotation    X 8 B 1 R 1 G          Reformer Leg Press  DL X 15 all  SL X 12 all DL X 15 all  SL X 12 all  DL X 15 all  SL X 10 all DL X 15 all  SL X 10 all  DL X 15 all   SL X 8 all        Reformer pull downs       X 15 1R 1 B          Scott Parmer, PT, DPT

## 2022-04-25 ENCOUNTER — HOSPITAL ENCOUNTER (OUTPATIENT)
Dept: PHYSICAL THERAPY | Facility: CLINIC | Age: 72
Discharge: HOME | End: 2022-04-25
Payer: COMMERCIAL

## 2022-04-25 DIAGNOSIS — M54.16 LUMBAR RADICULAR PAIN: ICD-10-CM

## 2022-04-25 DIAGNOSIS — G89.29 CHRONIC RIGHT-SIDED LOW BACK PAIN WITH RIGHT-SIDED SCIATICA: Primary | ICD-10-CM

## 2022-04-25 DIAGNOSIS — M47.816 LUMBAR FACET ARTHROPATHY: ICD-10-CM

## 2022-04-25 DIAGNOSIS — M54.41 CHRONIC RIGHT-SIDED LOW BACK PAIN WITH RIGHT-SIDED SCIATICA: Primary | ICD-10-CM

## 2022-04-25 PROCEDURE — 97110 THERAPEUTIC EXERCISES: CPT | Mod: GP

## 2022-04-25 NOTE — PROGRESS NOTES
Subjective:   Patient continuing to have R sided spasming which is inhibiting his ability to sleep and breathe. Has both good nights and bad nights, did not sleep well last night so he has been trying to sleep in his recliner which is helping.     Assessment:  Ankush presents to PT for his 11th MedX follow up. Reports compliance with the HEP thus far. Had a flare up with spasms. This is still bothering him this date. He is tender to the lateral mid ribs on the R side. Spoke with patient today regarding going into the doctor if it does not improve within the next week.     Lumbar MedX Re-test Initial testing    AROM (full=  0-72  lumbar) 0-42 0-42   Max Extension Torque   277   Flex: ext ratio (ideal 1.4:1) 1.69:1 2.77:1         Date 4/25/22  MEDX HELD 4/21/22  MEDX HELD TODAY 4/11/2022 4/4/22 3/31/22 3/28/22 3/24/22 3/21/22 3/17/22 3/14/22 3/10/22 3/1/22   Lumbar Parameters               Top Dead Center (TDC)   18 18 18 18 18 18 18 18 18 18   Counterbalance (CB)   450 450 450 450 450 450 450 450 450 450   Seat Pad   1 1 1 1 1 1 1 1 1 1   Femur Restraint   5 5 5 5 5 5 5 5 5 5   Week/Visit               Enter Week/Visit #   Wk 6  Wk 5 Wk 4 V 2  Wk 4 V 1 Wk 3 V 2  Wk 3 V 1  W2V2 W2V1 W1V2 W1V1   Weight (lbs)   180# (keep weight next time) 174# 168# 163# 155# 145# 136# 130# 120#  277# (Max)    Reps (#)   20 30 30 30 30 35 30 30 30 --   Time   82 78 72 115  110    --   ROM (degrees)   0-42 0-42 0-42 0-42 0-42 0-42 0-42 0-42 0-42 0-42   Pain   Fatigue - keep this weight    fatigue None  None None None none None   Flex:Ext ratio     1.69:1       2.77:1   Cervical Parameters               Top Dead Center (TDC)               Counterbalance (CB)               Seat Height               Week/Visit               Enter Week/Visit #               Weight (lbs)               Reps (#)               Time               ROM (degrees)               Pain               Flex:Ext ratio                 Date 4/25 4/21 4/11/22 4/4/22 3/31/22  3/28/22 3/24/22  3/21/22 3/17/22 3/14/22 3/10/22 3/1/22   Exercise               Supine PPTs  Verbal discussion         X 10  X 10, 5 sec holds   Sidelying clamshells           Verbally discussed X 10 B, orange band   Supine LTRs  Verbal discussion         X 10 B X 10 B   Supine SKTC stretch  Verbal discussion           X 30 sec holds each leg    NuStep X 6 min  X 4 min X 5 min X 4 min X 5 min X 6 min X 5 min X 5 min    X 4 min    Rotary Torso   42# to L  42# to the R 42# to the R  42# to the L  40# to the R  38# to the R  36# to the R 32# to the R  30# to the R    S/l bow and arrow  Verbal discussion         X 10 B     Supine bridges       X 10 B        Supine Marching        X 20    X 10 B     Supine hamstring stretch  Verbal discussion        X 30 sec holds B      Seated UT stretch  X 1 min holds             Supine pretzel   Verbal discussion     X 30 sec holds   X 30 sec holds     Reformer bridge   X 15 with leg press all X 10 with leg press all  X 10   X 8 with leg press all         Reformer ab rotation     X 8 B 1 R 1 G          Reformer Leg Press   DL X 15 all  SL X 12 all DL X 15 all  SL X 12 all  DL X 15 all  SL X 10 all DL X 15 all  SL X 10 all  DL X 15 all   SL X 8 all        Reformer pull downs        X 15 1R 1 B          Verbal discussion to keep limiting heavy activity for the time being and focus on ROM, walking, and stretching as able. Discussed self rib bracing to help his pain when breathing.     Scott Parmer, PT, DPT

## 2022-05-09 ENCOUNTER — TELEPHONE (OUTPATIENT)
Dept: FAMILY MEDICINE | Facility: CLINIC | Age: 72
End: 2022-05-09

## 2022-05-09 NOTE — TELEPHONE ENCOUNTER
Pt is calling trying to get a weaning schedule in place for Gabapentin.     Pt already started the process of weaning himself since he isn't taking 6 tabs a day anymore.     Spoke with clinic RN Belinda KOROMA and she advised that patient for the next 4/5 days to take only 3x daily and to call us back on Thursday/Friday to check in and follow up with how he is doing. Was advised that with doing this if he experiences and lightheadedness/dizziness and tremors to call us right away.     Please advise if you are in agreement with plan so far and advise for further recommendation.

## 2022-05-13 NOTE — TELEPHONE ENCOUNTER
Patient is calling back, he has 12 tablets left, he is not having any issues and is feeling fine.     Call taken on 5/13/2022 at 11:28 AM by Marita Quevedo

## 2022-05-21 DIAGNOSIS — E11.9 DIABETES MELLITUS, TYPE 2 (H): ICD-10-CM

## 2022-05-23 RX ORDER — GLIPIZIDE 5 MG/1
TABLET, FILM COATED, EXTENDED RELEASE ORAL
Qty: 90 TABLET | Refills: 0 | Status: SHIPPED | OUTPATIENT
Start: 2022-05-23 | End: 2022-08-24

## 2022-05-23 NOTE — TELEPHONE ENCOUNTER
"Last Written Prescription Date:  5/25/21  Last Fill Quantity: 90,  # refills: 3   Last office visit provider:  3/3/22     Requested Prescriptions   Pending Prescriptions Disp Refills     glipiZIDE (GLUCOTROL XL) 5 MG 24 hr tablet [Pharmacy Med Name: glipiZIDE ER 5 MG Oral Tablet Extended Release 24 Hour] 90 tablet 0     Sig: Take 1 tablet by mouth once daily with breakfast       Sulfonylurea Agents Passed - 5/23/2022  7:47 AM        Passed - Patient has documented A1c within the specified period of time.     If HgbA1C is 8 or greater, it needs to be on file within the past 3 months.  If less than 8, must be on file within the past 6 months.     Recent Labs   Lab Test 01/31/22  1107   A1C 6.7*             Passed - Medication is active on med list        Passed - Patient is age 18 or older        Passed - Patient has a recent creatinine (normal) within the past 12 mos.     Recent Labs   Lab Test 01/31/22  1107   CR 0.95       Ok to refill medication if creatinine is low          Passed - Recent (6 mo) or future (30 days) visit within the authorizing provider's specialty     Patient had office visit in the last 6 months or has a visit in the next 30 days with authorizing provider or within the authorizing provider's specialty.  See \"Patient Info\" tab in inbasket, or \"Choose Columns\" in Meds & Orders section of the refill encounter.                 Frank Riley RN 05/23/22 7:48 AM  "

## 2022-07-27 NOTE — LETTER
2/10/2022         RE: Ankush Zaidi  [address]  [place] [facility] [address]        Dear Colleague,    Thank you for referring your patient, Ankush Zaidi, to the [facility] [place]. Please see a copy of my visit note below.    Assessment:   Ankush Zaidi is a 71 year old y.o. male with past medical history significant for sleep apnea, GERD, type 2 diabetes mellitus, hyperlipidemia  who presents today for follow-up regarding  chronic intermittent right low back pain with radiation into the right lower extremity with associated numbness and tingling.  My review of an MRI lumbar spine shows moderate disc degeneration and moderate facet arthropathy causing moderate bilateral foraminal stenosis.  There is trace degenerative spondylolisthesis at this level.  Patient is neurologically intact.       Plan:     A shared decision making plan was used.  The patient's values and choices were respected.  The following represents what was discussed and decided upon by the physician assistant and the patient.      1.  DIAGNOSTIC TESTS: I reviewed the MRI lumbar spine.   -I ordered flexion-extension lumbar spine x-rays to make sure there is not any instability at the L4-5 level.    2.  PHYSICAL THERAPY: Patient has been referred to physical therapy.  I would like him to do the medics program.  He was given the phone number to schedule.    3.  MEDICATIONS:  No changes are made to the patient's medications.  -Patient takes gabapentin 600 mg 3 times daily.  This is actually prescribed for post herpetic neuralgia but could be helping with lumbar radicular pain as well.  -I did offer to provide a prescription for tizanidine but patient declined.  He feels like he takes too many medications already.  -Aleve was not helpful.  -Topical medications have not been helpful.    4.  INTERVENTIONS: No interventions were ordered today.  Patient is going to trial physical therapy first.  -If he fails to improve,  we could try a right L4-5 transforaminal epidural steroid injection.    5.  PATIENT EDUCATION: Patient is in agreement the above plan.  All questions were answered.    6.  FOLLOW-UP: Patient will follow up with me in 6 weeks to monitor progress with physical therapy.  If he has questions or concerns in the meantime, he should not hesitate to call.    Subjective:     Ankush Zaidi is a 71 year old male who presents today for follow-up regarding chronic intermittent right low back pain with radiation into the right lower extremity with associated numbness and tingling.  I saw the patient in consultation February 7, 2022.  At that time I ordered an MRI lumbar spine.  He returns today to review the results.  He denies any change in his pain since he was last seen.    Patient complains of right-sided low back pain.  Almost every time he twists to clean himself after having a bowel movement he gets a flare of pain that radiates into the right buttock and down the right posterior lateral thigh to the knee.  Flares resolve within 1 to 2 minutes of standing and then he does fairly well up until his next bowel movement.  He also has some increased pain with bending and tying his shoes.  He rates his pain today as a 1 out of 10.  At its worst it is an 8 or 10.  At its best it is a 1 out of 10.    Patient has not had any formal physical therapy for this, although a sports medicine doctor did teach him some core stabilization exercises.  He has tried 5 or 6 sessions of chiropractic treatment.  He is currently taking gabapentin for postherpetic neuralgia.  He takes 600 mg 3 times daily.    Review of Systems:  Positive for numbness/tingling, weakness.  Negative for loss of bowel/bladder control, inability to urinate, headache, pain much worse at night, trip/stumble/falls, difficulty swallowing, difficulty with hand skills, fevers, unintentional weight loss.     Objective:   CONSTITUTIONAL:  Vital signs as above.  No acute  distress.  The patient is well nourished and well groomed.    PSYCHIATRIC:  The patient is awake, alert, oriented to person, place and time.  The patient is answering questions appropriately with clear speech.  Normal affect.  HEENT: Normocephalic, atraumatic.  Sclera clear.    SKIN:  Skin over the face, posterior torso, bilateral upper and lower extremities is clean, dry, intact without rashes.  VASCULAR: No significant lower extremity edema.  MUSCULOSKELETAL:  Gait is non-antalgic.  The patient is able to heel and toe walk without any difficulty.  Mild tenderness over the right lower lumbar paraspinal muscles.      The patient has 5/5 strength for the bilateral hip flexors, knee flexors/extensors, ankle dorsiflexors/plantar flexors.  NEUROLOGICAL: 2+ patellar reflexes which are symmetric bilaterally.   Sensation to light touch is intact in the bilateral L4, L5, and S1 dermatomes.       RESULTS: I reviewed the MRI lumbar spine from [facility] dated February 7, 2022.  At L4-5 there is moderate disc degeneration and moderate bilateral facet degeneration.  There is trace degenerative spondylolisthesis.  There is moderate bilateral foraminal stenosis at this level.  At L3-4 there is moderate disc degeneration and minimal facet degeneration with mild right and mild to moderate left foraminal stenosis.  Please see report for further details.          Again, thank you for allowing me to participate in the care of your patient.        Sincerely,        Jeny Jones PA-C     [place] Pregnancy And Lactation Text: This medication is not safe during pregnancy. It is unknown if this medication is excreted in breast milk.

## 2022-08-22 ENCOUNTER — TELEPHONE (OUTPATIENT)
Dept: FAMILY MEDICINE | Facility: CLINIC | Age: 72
End: 2022-08-22

## 2022-08-22 ENCOUNTER — TELEPHONE (OUTPATIENT)
Dept: LAB | Facility: CLINIC | Age: 72
End: 2022-08-22

## 2022-08-22 DIAGNOSIS — E11.69 TYPE 2 DIABETES MELLITUS WITH OTHER SPECIFIED COMPLICATION, WITHOUT LONG-TERM CURRENT USE OF INSULIN (H): Primary | ICD-10-CM

## 2022-08-22 NOTE — TELEPHONE ENCOUNTER
Patient scheduled apt for tomorrow  for A1c and urine micro albumin. Last visit 1-31-22 should he be seen in clinic or lab only? Please call patient back if ov is needed

## 2022-08-23 ENCOUNTER — LAB (OUTPATIENT)
Dept: LAB | Facility: CLINIC | Age: 72
End: 2022-08-23
Payer: COMMERCIAL

## 2022-08-23 DIAGNOSIS — E11.69 TYPE 2 DIABETES MELLITUS WITH OTHER SPECIFIED COMPLICATION, WITHOUT LONG-TERM CURRENT USE OF INSULIN (H): ICD-10-CM

## 2022-08-23 LAB
ALBUMIN SERPL BCG-MCNC: 4.4 G/DL (ref 3.5–5.2)
ALP SERPL-CCNC: 77 U/L (ref 40–129)
ALT SERPL W P-5'-P-CCNC: 20 U/L (ref 10–50)
ANION GAP SERPL CALCULATED.3IONS-SCNC: 12 MMOL/L (ref 7–15)
AST SERPL W P-5'-P-CCNC: 29 U/L (ref 10–50)
BILIRUB SERPL-MCNC: 0.6 MG/DL
BUN SERPL-MCNC: 20.4 MG/DL (ref 8–23)
CALCIUM SERPL-MCNC: 9.1 MG/DL (ref 8.8–10.2)
CHLORIDE SERPL-SCNC: 104 MMOL/L (ref 98–107)
CHOLEST SERPL-MCNC: 151 MG/DL
CREAT SERPL-MCNC: 0.9 MG/DL (ref 0.67–1.17)
CREAT UR-MCNC: 161 MG/DL
DEPRECATED HCO3 PLAS-SCNC: 23 MMOL/L (ref 22–29)
GFR SERPL CREATININE-BSD FRML MDRD: >90 ML/MIN/1.73M2
GLUCOSE SERPL-MCNC: 173 MG/DL (ref 70–99)
HBA1C MFR BLD: 7.2 % (ref 0–5.6)
HDLC SERPL-MCNC: 39 MG/DL
LDLC SERPL CALC-MCNC: 88 MG/DL
MICROALBUMIN UR-MCNC: 18 MG/L
MICROALBUMIN/CREAT UR: 11.18 MG/G CR (ref 0–17)
NONHDLC SERPL-MCNC: 112 MG/DL
POTASSIUM SERPL-SCNC: 4.3 MMOL/L (ref 3.4–5.3)
PROT SERPL-MCNC: 7.4 G/DL (ref 6.4–8.3)
SODIUM SERPL-SCNC: 139 MMOL/L (ref 136–145)
TRIGL SERPL-MCNC: 120 MG/DL

## 2022-08-23 PROCEDURE — 80061 LIPID PANEL: CPT

## 2022-08-23 PROCEDURE — 82043 UR ALBUMIN QUANTITATIVE: CPT

## 2022-08-23 PROCEDURE — 36415 COLL VENOUS BLD VENIPUNCTURE: CPT

## 2022-08-23 PROCEDURE — 80053 COMPREHEN METABOLIC PANEL: CPT

## 2022-08-23 PROCEDURE — 83036 HEMOGLOBIN GLYCOSYLATED A1C: CPT

## 2022-08-24 DIAGNOSIS — E11.9 DIABETES MELLITUS, TYPE 2 (H): ICD-10-CM

## 2022-08-24 DIAGNOSIS — E78.5 HYPERLIPIDEMIA: ICD-10-CM

## 2022-08-24 DIAGNOSIS — E11.69 TYPE 2 DIABETES MELLITUS WITH OTHER SPECIFIED COMPLICATION, WITHOUT LONG-TERM CURRENT USE OF INSULIN (H): ICD-10-CM

## 2022-08-24 RX ORDER — SIMVASTATIN 20 MG
TABLET ORAL
Qty: 90 TABLET | Refills: 3 | Status: SHIPPED | OUTPATIENT
Start: 2022-08-24 | End: 2023-08-28

## 2022-08-24 RX ORDER — GLIPIZIDE 5 MG/1
TABLET, FILM COATED, EXTENDED RELEASE ORAL
Qty: 90 TABLET | Refills: 3 | Status: SHIPPED | OUTPATIENT
Start: 2022-08-24 | End: 2023-08-28

## 2022-09-16 ENCOUNTER — OFFICE VISIT (OUTPATIENT)
Dept: FAMILY MEDICINE | Facility: CLINIC | Age: 72
End: 2022-09-16
Payer: COMMERCIAL

## 2022-09-16 VITALS
OXYGEN SATURATION: 98 % | BODY MASS INDEX: 28.06 KG/M2 | DIASTOLIC BLOOD PRESSURE: 76 MMHG | HEART RATE: 55 BPM | WEIGHT: 190 LBS | SYSTOLIC BLOOD PRESSURE: 128 MMHG

## 2022-09-16 DIAGNOSIS — M54.50 CHRONIC LOW BACK PAIN WITHOUT SCIATICA, UNSPECIFIED BACK PAIN LATERALITY: ICD-10-CM

## 2022-09-16 DIAGNOSIS — H61.23 CERUMINOSIS, BILATERAL: Primary | ICD-10-CM

## 2022-09-16 DIAGNOSIS — G89.29 CHRONIC LOW BACK PAIN WITHOUT SCIATICA, UNSPECIFIED BACK PAIN LATERALITY: ICD-10-CM

## 2022-09-16 PROCEDURE — 99213 OFFICE O/P EST LOW 20 MIN: CPT | Performed by: FAMILY MEDICINE

## 2022-09-16 NOTE — PROGRESS NOTES
Ankush Zaidi  /[birth year]   Pulse 55   Wt 86.2 kg (190 lb)   SpO2 98%   BMI 28.06 kg/m       Assessment/Plan:                Ankush was seen today for ear problem and back pain.    Diagnoses and all orders for this visit:    Ceruminosis, bilateral    Chronic low back pain without sciatica, unspecified back pain laterality         DISCUSSION  See discussion below.  Cerumen gnosis resolved with irrigation.  Subjective:     HPI:    Ankush Zaidi is a 72 year old male with a medical history significant for type 2 diabetes, acid reflux, hyperlipidemia, sleep apnea and chronic back pain is here today over concerns of bilateral ear plugging.    He was determined to have significant wax buildup.  His ears were irrigated and wax was removed resolving the situation.  No other problems or concerns with the ears are reported specifically no tinnitus, no hearing difficulties after irrigation.  No vertigo.    He did have questions about back pain management.  We reviewed his situation including discussing a recent MRI scan.  Discussed the importance of maintenance of flexibility and strength.  Discussed pain relieving medications including topical pain relievers.    ROS:  Complete review of systems is obtained.  Other than the specific considerations noted above complete review of systems is negative.          Objective:   Medications:  Current Outpatient Medications   Medication     aspirin 81 MG EC tablet     glipiZIDE (GLUCOTROL XL) 5 MG 24 hr tablet     metFORMIN (GLUCOPHAGE) 500 MG tablet     simvastatin (ZOCOR) 20 MG tablet     No current facility-administered medications for this visit.        Allergies:     Allergies   Allergen Reactions     Voltaren [Diclofenac] Unknown     Blood in urine        Social History     Socioeconomic History     Marital status:      Spouse name: Not on file     Number of children: Not on file     Years of education: Not on file     Highest education level: Not on file    Occupational History     Not on file   Tobacco Use     Smoking status: Former Smoker     Types: Cigarettes     Quit date: 1969     Years since quittin.0     Smokeless tobacco: Never Used   Substance and Sexual Activity     Alcohol use: Yes     Alcohol/week: 3.3 standard drinks     Drug use: No     Sexual activity: Not on file   Other Topics Concern     Not on file   Social History Narrative     Not on file     Social Determinants of Health     Financial Resource Strain: Not on file   Food Insecurity: Not on file   Transportation Needs: Not on file   Physical Activity: Not on file   Stress: Not on file   Social Connections: Not on file   Intimate Partner Violence: Not on file   Housing Stability: Not on file       No family history on file.     Most Recent Immunizations   Administered Date(s) Administered     COVID-19,PF,Pfizer (12+ Yrs) 2021     Influenza (High Dose) 3 valent vaccine 2020     Influenza (IIV3) PF 2008     Influenza Vaccine, 6+MO IM (QUADRIVALENT W/PRESERVATIVES) 2019     Influenza, Quad, High Dose, Pf, 65yr+ (Fluzone HD) 2022     Pneumo Conj 13-V (2010&after) 07/10/2019     Pneumococcal 23 valent 2010     Td (Adult), Adsorbed 1999     Tdap (Adacel,Boostrix) 07/10/2019     Zoster vaccine recombinant adjuvanted (SHINGRIX) 06/10/2022        Wt Readings from Last 3 Encounters:   22 86.2 kg (190 lb)   02/10/22 87.1 kg (192 lb)   22 87.4 kg (192 lb 9.6 oz)        BP Readings from Last 6 Encounters:   22 128/76   22 (!) 152/82   22 118/70   02/10/22 (!) 141/82   22 123/75   22 128/76        Hemoglobin A1C   Date Value Ref Range Status   2022 7.2 (H) 0.0 - 5.6 % Final     Comment:     Normal <5.7%   Prediabetes 5.7-6.4%    Diabetes 6.5% or higher     Note: Adopted from ADA consensus guidelines.   2022 6.7 (H) 0.0 - 5.6 % Final     Comment:     Normal <5.7%   Prediabetes 5.7-6.4%    Diabetes 6.5% or  higher     Note: Adopted from ADA consensus guidelines.   09/09/2021 6.6 (H) 0.0 - 5.6 % Final     Comment:     Normal <5.7%   Prediabetes 5.7-6.4%    Diabetes 6.5% or higher     Note: Adopted from ADA consensus guidelines.              PHYSICAL EXAM:    /76   Pulse 55   Wt 86.2 kg (190 lb)   SpO2 98%   BMI 28.06 kg/m       General: Patient alert no signs of distress    Examination of his ears reveals initially impacted with cerumen upon irrigation cerumen was removed, limited view of tympanic membranes due to torturous narrow ear canals is somewhat limited but appear grossly normal.  No other abnormality seen.

## 2022-11-02 ENCOUNTER — TRANSFERRED RECORDS (OUTPATIENT)
Dept: HEALTH INFORMATION MANAGEMENT | Facility: CLINIC | Age: 72
End: 2022-11-02

## 2022-11-02 LAB — RETINOPATHY: NEGATIVE

## 2022-11-20 ENCOUNTER — HEALTH MAINTENANCE LETTER (OUTPATIENT)
Age: 72
End: 2022-11-20

## 2023-02-01 NOTE — PROGRESS NOTES
[facility] Rehabilitation Service    Outpatient Physical Therapy Discharge Note  Patient: Ankush Zaidi  : 1950    Beginning/End Dates of Reporting Period:  3/1/22 to 22    Referring Provider: Jeny Jones Pa-C    Therapy Diagnosis: Right Low back pain     Client Self Report: Patient continuing to have R sided spasming which is inhibiting his ability to sleep and breathe. Has both good nights and bad nights, did not sleep well last night so he has been trying to sleep in his recliner which is helping.    Objective Measurements:  Objective Measure: Palpation  Details: Tender to the lateral R mid ribs       Goals:  Goal Identifier Pain   Goal Description Patient will report no greater than 2/10 at worst to improve QoL.    Target Date 22   Date Met      Progress (detail required for progress note):       Goal Identifier Toileting   Goal Description Patient will report no discomfort when toileting to return to PLOF and improve QoL.    Target Date 22   Date Met      Progress (detail required for progress note):       Goal Identifier Lifting/Carrying   Goal Description Patient will demonstrate ability to lift 20# with good mechanics and no pain to return to PLOF.    Target Date 22   Date Met      Progress (detail required for progress note):       Goal Identifier Hip Strength   Goal Description Patient will improve B hip strength to at least 4+/5 to improve functional strength and offload low back.    Target Date 22   Date Met      Progress (detail required for progress note):       Goal Identifier     Goal Description     Target Date     Date Met      Progress (detail required for progress note):       Goal Identifier     Goal Description     Target Date     Date Met      Progress (detail required for progress note):       Goal Identifier     Goal Description     Target Date     Date Met      Progress  (detail required for progress note):       Goal Identifier     Goal Description     Target Date     Date Met      Progress (detail required for progress note):             Plan:  Discharge from therapy.    Discharge:    Reason for Discharge: Pt has not been seen in clinic since 4/2022 so is discharged from caseload at this time    Equipment Issued:     Discharge Plan: Patient to continue home program.

## 2023-02-01 NOTE — ADDENDUM NOTE
Encounter addended by: Gini Oneal, PT on: 2/1/2023 12:46 PM   Actions taken: Episode resolved, Clinical Note Signed

## 2023-02-28 ENCOUNTER — TELEPHONE (OUTPATIENT)
Dept: FAMILY MEDICINE | Facility: CLINIC | Age: 73
End: 2023-02-28
Payer: COMMERCIAL

## 2023-02-28 DIAGNOSIS — E11.69 TYPE 2 DIABETES MELLITUS WITH OTHER SPECIFIED COMPLICATION, WITHOUT LONG-TERM CURRENT USE OF INSULIN (H): Primary | ICD-10-CM

## 2023-02-28 NOTE — TELEPHONE ENCOUNTER
Pt is calling to get his 6 month follow up A1C recheck. Please place orders as patient is scheduling for tomorrow in the AM

## 2023-03-01 ENCOUNTER — LAB (OUTPATIENT)
Dept: LAB | Facility: CLINIC | Age: 73
End: 2023-03-01
Payer: COMMERCIAL

## 2023-03-01 DIAGNOSIS — E11.69 TYPE 2 DIABETES MELLITUS WITH OTHER SPECIFIED COMPLICATION, WITHOUT LONG-TERM CURRENT USE OF INSULIN (H): ICD-10-CM

## 2023-03-01 LAB
ALBUMIN SERPL BCG-MCNC: 4.2 G/DL (ref 3.5–5.2)
ALP SERPL-CCNC: 75 U/L (ref 40–129)
ALT SERPL W P-5'-P-CCNC: 20 U/L (ref 10–50)
ANION GAP SERPL CALCULATED.3IONS-SCNC: 11 MMOL/L (ref 7–15)
AST SERPL W P-5'-P-CCNC: 25 U/L (ref 10–50)
BILIRUB SERPL-MCNC: 0.6 MG/DL
BUN SERPL-MCNC: 21.5 MG/DL (ref 8–23)
CALCIUM SERPL-MCNC: 8.8 MG/DL (ref 8.8–10.2)
CHLORIDE SERPL-SCNC: 104 MMOL/L (ref 98–107)
CREAT SERPL-MCNC: 0.9 MG/DL (ref 0.67–1.17)
DEPRECATED HCO3 PLAS-SCNC: 24 MMOL/L (ref 22–29)
GFR SERPL CREATININE-BSD FRML MDRD: >90 ML/MIN/1.73M2
GLUCOSE SERPL-MCNC: 160 MG/DL (ref 70–99)
HBA1C MFR BLD: 7.4 % (ref 0–5.6)
POTASSIUM SERPL-SCNC: 4.3 MMOL/L (ref 3.4–5.3)
PROT SERPL-MCNC: 7.1 G/DL (ref 6.4–8.3)
SODIUM SERPL-SCNC: 139 MMOL/L (ref 136–145)

## 2023-03-01 PROCEDURE — 80053 COMPREHEN METABOLIC PANEL: CPT

## 2023-03-01 PROCEDURE — 36415 COLL VENOUS BLD VENIPUNCTURE: CPT

## 2023-03-01 PROCEDURE — 83036 HEMOGLOBIN GLYCOSYLATED A1C: CPT

## 2023-07-31 ENCOUNTER — TELEPHONE (OUTPATIENT)
Dept: FAMILY MEDICINE | Facility: CLINIC | Age: 73
End: 2023-07-31
Payer: COMMERCIAL

## 2023-07-31 NOTE — TELEPHONE ENCOUNTER
Reason for call:  Other     Patient called regarding (reason for call): Lab Order     Additional comments: Patient called to schedule a blood draw and I told him he needs an order from the provider and he states this what he was told and I scheduled him for 08/08/2023 at 10:10 am for an office visit and I told him I would put a note in for a order for blood draw. Please advise and call patient back if needed please and thank you.    Phone number to reach patient:  Home number on file [phone] (home)    Best Time:  any    Can we leave a detailed message on this number?  YES

## 2023-08-08 ENCOUNTER — LAB (OUTPATIENT)
Dept: FAMILY MEDICINE | Facility: CLINIC | Age: 73
End: 2023-08-08

## 2023-08-08 ENCOUNTER — OFFICE VISIT (OUTPATIENT)
Dept: FAMILY MEDICINE | Facility: CLINIC | Age: 73
End: 2023-08-08
Payer: COMMERCIAL

## 2023-08-08 VITALS
SYSTOLIC BLOOD PRESSURE: 128 MMHG | RESPIRATION RATE: 18 BRPM | HEART RATE: 69 BPM | BODY MASS INDEX: 28.44 KG/M2 | WEIGHT: 192 LBS | TEMPERATURE: 98.6 F | DIASTOLIC BLOOD PRESSURE: 76 MMHG | HEIGHT: 69 IN | OXYGEN SATURATION: 99 %

## 2023-08-08 DIAGNOSIS — Z12.11 COLON CANCER SCREENING: ICD-10-CM

## 2023-08-08 DIAGNOSIS — E11.69 TYPE 2 DIABETES MELLITUS WITH OTHER SPECIFIED COMPLICATION, WITHOUT LONG-TERM CURRENT USE OF INSULIN (H): Primary | ICD-10-CM

## 2023-08-08 DIAGNOSIS — E78.5 HYPERLIPIDEMIA, UNSPECIFIED HYPERLIPIDEMIA TYPE: ICD-10-CM

## 2023-08-08 LAB
ALBUMIN SERPL BCG-MCNC: 4.4 G/DL (ref 3.5–5.2)
ALP SERPL-CCNC: 72 U/L (ref 40–129)
ALT SERPL W P-5'-P-CCNC: 14 U/L (ref 0–70)
ANION GAP SERPL CALCULATED.3IONS-SCNC: 11 MMOL/L (ref 7–15)
AST SERPL W P-5'-P-CCNC: 29 U/L (ref 0–45)
BILIRUB SERPL-MCNC: 0.6 MG/DL
BUN SERPL-MCNC: 20.8 MG/DL (ref 8–23)
CALCIUM SERPL-MCNC: 9.2 MG/DL (ref 8.8–10.2)
CHLORIDE SERPL-SCNC: 104 MMOL/L (ref 98–107)
CREAT SERPL-MCNC: 0.99 MG/DL (ref 0.67–1.17)
CREAT UR-MCNC: 172 MG/DL
DEPRECATED HCO3 PLAS-SCNC: 24 MMOL/L (ref 22–29)
GFR SERPL CREATININE-BSD FRML MDRD: 80 ML/MIN/1.73M2
GLUCOSE SERPL-MCNC: 184 MG/DL (ref 70–99)
HBA1C MFR BLD: 7.3 % (ref 0–5.6)
MICROALBUMIN UR-MCNC: 28.6 MG/L
MICROALBUMIN/CREAT UR: 16.63 MG/G CR (ref 0–17)
POTASSIUM SERPL-SCNC: 4.2 MMOL/L (ref 3.4–5.3)
PROT SERPL-MCNC: 7.4 G/DL (ref 6.4–8.3)
SODIUM SERPL-SCNC: 139 MMOL/L (ref 136–145)

## 2023-08-08 PROCEDURE — 82570 ASSAY OF URINE CREATININE: CPT | Performed by: FAMILY MEDICINE

## 2023-08-08 PROCEDURE — 82043 UR ALBUMIN QUANTITATIVE: CPT | Performed by: FAMILY MEDICINE

## 2023-08-08 PROCEDURE — 83036 HEMOGLOBIN GLYCOSYLATED A1C: CPT | Performed by: FAMILY MEDICINE

## 2023-08-08 PROCEDURE — 99214 OFFICE O/P EST MOD 30 MIN: CPT | Performed by: FAMILY MEDICINE

## 2023-08-08 PROCEDURE — 36415 COLL VENOUS BLD VENIPUNCTURE: CPT | Performed by: FAMILY MEDICINE

## 2023-08-08 PROCEDURE — 80053 COMPREHEN METABOLIC PANEL: CPT | Performed by: FAMILY MEDICINE

## 2023-08-08 ASSESSMENT — PAIN SCALES - GENERAL: PAINLEVEL: NO PAIN (0)

## 2023-08-08 NOTE — PROGRESS NOTES
"Ankush Zaidi  /[birth year]   Pulse 69   Temp 98.6  F (37  C)   Resp 18   Ht 1.753 m (5' 9\")   Wt 87.1 kg (192 lb)   SpO2 99%   BMI 28.35 kg/m       Assessment/Plan:                Ankush was seen today for recheck medication and diabetes.    Diagnoses and all orders for this visit:    Type 2 diabetes mellitus with other specified complication, without long-term current use of insulin (H)  -     Comprehensive metabolic panel (BMP + Alb, Alk Phos, ALT, AST, Total. Bili, TP)  -     Hemoglobin A1c  -     Albumin Random Urine Quantitative with Creat Ratio    Hyperlipidemia, unspecified hyperlipidemia type  -     Comprehensive metabolic panel (BMP + Alb, Alk Phos, ALT, AST, Total. Bili, TP)  -     Lipid panel reflex to direct LDL Fasting    Colon cancer screening  -     [employer]); Future         DISCUSSION  continue current medication treatment. Check additional labs as noted. Arrange for colorguard testing see additional discussion below.  Subjective:     HPI:    Ankush Zaidi is a 73 year old male is here today for follow-up of diabetes hyperlipidemia and to discuss colon cancer screening.    Diabetes is on metformin glipizide. Higher doses metformin the past have resulted in gastric irritation. He reports no concerns related to medications currently. Reports blood sugars are typically 121 check fasting. He reports no concerns for hypoglycemia. A1c is any reasonable range and consistent with previous. Discussed continued efforts through dietary modification to try and improve overall diabetic control. He is up-to-date with eye exam no history of retinopathy. He has no history of microalbuminuria. Due for testing. His blood pressure is ideal. He is on simvastatin and aspirin for vascular disease risk reduction. No history of vascular disease. Overall feeling well denies chest pain shortness of breath claudication dizziness lightheadedness syncope and other similar significant concerns.    Discuss " colon cancer screening options. Apparently has had fit testing done through his insurance provider but no results are available here. Discussed other options for screening he is interested in colorguard testing. Will arrange for testing in this fashion. No history of any personal problems or strong family history. He is at average risk.        ROS:  Complete review of systems is obtained.  Other than the specific considerations noted above complete review of systems is negative.          Objective:   Medications:  Current Outpatient Medications   Medication    glipiZIDE (GLUCOTROL XL) 5 MG 24 hr tablet    metFORMIN (GLUCOPHAGE) 500 MG tablet    simvastatin (ZOCOR) 20 MG tablet    aspirin 81 MG EC tablet     No current facility-administered medications for this visit.        Allergies:     Allergies   Allergen Reactions    Voltaren [Diclofenac] Unknown     Blood in urine        Social History     Socioeconomic History    Marital status:      Spouse name: Not on file    Number of children: Not on file    Years of education: Not on file    Highest education level: Not on file   Occupational History    Not on file   Tobacco Use    Smoking status: Former     Types: Cigarettes     Quit date: 1969     Years since quittin.9    Smokeless tobacco: Never   Substance and Sexual Activity    Alcohol use: Yes     Alcohol/week: 3.3 standard drinks of alcohol    Drug use: No    Sexual activity: Not on file   Other Topics Concern    Not on file   Social History Narrative    Not on file     Social Determinants of Health     Financial Resource Strain: Not on file   Food Insecurity: Not on file   Transportation Needs: Not on file   Physical Activity: Not on file   Stress: Not on file   Social Connections: Not on file   Intimate Partner Violence: Not on file   Housing Stability: Not on file       No family history on file.     Most Recent Immunizations   Administered Date(s) Administered    COVID-19 Bivalent 12+ (Pfizer)  "10/19/2022    COVID-19 MONOVALENT 12+ (Pfizer) 11/22/2021    FLUAD(HD)65+ QUAD 11/18/2022    Influenza (High Dose) 3 valent vaccine 11/01/2020    Influenza (IIV3) PF 11/12/2008    Influenza Vaccine 65+ (Fluzone HD) 01/07/2022    Influenza Vaccine, 6+MO IM (QUADRIVALENT W/PRESERVATIVES) 11/04/2019    Pneumo Conj 13-V (2010&after) 07/10/2019    Pneumococcal 23 valent 06/16/2010    TDAP (Adacel,Boostrix) 07/10/2019    Td (Adult), Adsorbed 07/30/1999    Zoster recombinant adjuvanted (SHINGRIX) 09/22/2022        Wt Readings from Last 3 Encounters:   08/08/23 87.1 kg (192 lb)   09/16/22 86.2 kg (190 lb)   02/10/22 87.1 kg (192 lb)        BP Readings from Last 6 Encounters:   08/08/23 128/76   09/16/22 128/76   03/24/22 (!) 152/82   03/03/22 118/70   02/10/22 (!) 141/82   02/07/22 123/75        Hemoglobin A1C   Date Value Ref Range Status   08/08/2023 7.3 (H) 0.0 - 5.6 % Final     Comment:     Normal <5.7%   Prediabetes 5.7-6.4%    Diabetes 6.5% or higher     Note: Adopted from ADA consensus guidelines.   03/01/2023 7.4 (H) 0.0 - 5.6 % Final     Comment:     Normal <5.7%   Prediabetes 5.7-6.4%    Diabetes 6.5% or higher     Note: Adopted from ADA consensus guidelines.   08/23/2022 7.2 (H) 0.0 - 5.6 % Final     Comment:     Normal <5.7%   Prediabetes 5.7-6.4%    Diabetes 6.5% or higher     Note: Adopted from ADA consensus guidelines.              PHYSICAL EXAM:    /76   Pulse 69   Temp 98.6  F (37  C)   Resp 18   Ht 1.753 m (5' 9\")   Wt 87.1 kg (192 lb)   SpO2 99%   BMI 28.35 kg/m           General Appearance:    Alert, cooperative, no distress   Eyes:   No scleral icterus or conjunctival irritation       Ears:    Normal TM's and external ear canals, both ears   Throat:   Lips, mucosa, and tongue normal; teeth and gums normal   Neck:   Supple, symmetrical, trachea midline, no adenopathy;        thyroid:  No enlargement/tenderness/nodules   Lungs:     Clear to auscultation bilaterally, respirations unlabored, no " wheezesor crackles   Heart:    Regular rate and rhythm,  No murmur   Abdomen:    Soft, no distention, no tenderness on palpation, no masses, no organomegaly     Extremities:  No edema, no jointswelling or redness, no evidence of any injuries, no distinctly palpable pulsations, good capillary refill good skin integrity, no ulcerations.  No significant callus formation.  No other foot deformities.   Skin:  Noconcerning skin findings, no suspicious moles, no rashes   Neurologic:  On gross examination there is no motor or sensory deficit.  Specific examination of the feet using the monofilament line revealsthat there is no absence of sensation.  Patient walks with a normal gait

## 2023-08-22 LAB — NONINV COLON CA DNA+OCC BLD SCRN STL QL: NEGATIVE

## 2023-08-28 DIAGNOSIS — E11.9 DIABETES MELLITUS, TYPE 2 (H): ICD-10-CM

## 2023-08-28 DIAGNOSIS — E78.5 HYPERLIPIDEMIA: ICD-10-CM

## 2023-08-28 DIAGNOSIS — E11.69 TYPE 2 DIABETES MELLITUS WITH OTHER SPECIFIED COMPLICATION, WITHOUT LONG-TERM CURRENT USE OF INSULIN (H): ICD-10-CM

## 2023-08-28 RX ORDER — GLIPIZIDE 5 MG/1
TABLET, FILM COATED, EXTENDED RELEASE ORAL
Qty: 90 TABLET | Refills: 1 | Status: SHIPPED | OUTPATIENT
Start: 2023-08-28 | End: 2024-02-21

## 2023-08-28 RX ORDER — SIMVASTATIN 20 MG
TABLET ORAL
Qty: 90 TABLET | Refills: 3 | Status: SHIPPED | OUTPATIENT
Start: 2023-08-28 | End: 2024-10-02

## 2023-08-28 NOTE — TELEPHONE ENCOUNTER
Pending Prescriptions:                       Disp   Refills    simvastatin (ZOCOR) 20 MG tablet          90 tab*3            Sig: [SIMVASTATIN (ZOCOR) 20 MG TABLET] TAKE 1 TABLET           BY MOUTH AT BEDTIME    metFORMIN (GLUCOPHAGE) 500 MG tablet      180 ta*3            Sig: Take 1 tablet (500 mg) by mouth 2 times daily           (with meals)    glipiZIDE (GLUCOTROL XL) 5 MG 24 hr [id number] tab*3            Sig: Take 1 tablet by mouth once daily with breakfast

## 2023-11-06 ENCOUNTER — TRANSFERRED RECORDS (OUTPATIENT)
Dept: HEALTH INFORMATION MANAGEMENT | Facility: CLINIC | Age: 73
End: 2023-11-06
Payer: COMMERCIAL

## 2023-11-06 LAB — RETINOPATHY: NEGATIVE

## 2023-11-22 ENCOUNTER — APPOINTMENT (OUTPATIENT)
Dept: LAB | Facility: CLINIC | Age: 73
End: 2023-11-22
Payer: COMMERCIAL

## 2023-12-08 ENCOUNTER — OFFICE VISIT (OUTPATIENT)
Dept: FAMILY MEDICINE | Facility: CLINIC | Age: 73
End: 2023-12-08
Payer: COMMERCIAL

## 2023-12-08 VITALS
HEART RATE: 77 BPM | BODY MASS INDEX: 29.03 KG/M2 | WEIGHT: 196 LBS | RESPIRATION RATE: 18 BRPM | HEIGHT: 69 IN | OXYGEN SATURATION: 98 % | SYSTOLIC BLOOD PRESSURE: 134 MMHG | DIASTOLIC BLOOD PRESSURE: 80 MMHG

## 2023-12-08 DIAGNOSIS — M79.644 PAIN OF FINGER OF RIGHT HAND: ICD-10-CM

## 2023-12-08 DIAGNOSIS — R76.0 ANTINUCLEAR ANTIBODY (ANA) TITER GREATER THAN 1:80: ICD-10-CM

## 2023-12-08 DIAGNOSIS — E78.5 HYPERLIPIDEMIA, UNSPECIFIED HYPERLIPIDEMIA TYPE: ICD-10-CM

## 2023-12-08 DIAGNOSIS — E11.69 TYPE 2 DIABETES MELLITUS WITH OTHER SPECIFIED COMPLICATION, WITHOUT LONG-TERM CURRENT USE OF INSULIN (H): Primary | ICD-10-CM

## 2023-12-08 LAB
CRP SERPL-MCNC: <3 MG/L
HBA1C MFR BLD: 8.7 % (ref 0–5.6)

## 2023-12-08 PROCEDURE — 86038 ANTINUCLEAR ANTIBODIES: CPT | Performed by: FAMILY MEDICINE

## 2023-12-08 PROCEDURE — 86039 ANTINUCLEAR ANTIBODIES (ANA): CPT | Performed by: FAMILY MEDICINE

## 2023-12-08 PROCEDURE — 83036 HEMOGLOBIN GLYCOSYLATED A1C: CPT | Performed by: FAMILY MEDICINE

## 2023-12-08 PROCEDURE — 36415 COLL VENOUS BLD VENIPUNCTURE: CPT | Performed by: FAMILY MEDICINE

## 2023-12-08 PROCEDURE — 99214 OFFICE O/P EST MOD 30 MIN: CPT | Performed by: FAMILY MEDICINE

## 2023-12-08 PROCEDURE — 86140 C-REACTIVE PROTEIN: CPT | Performed by: FAMILY MEDICINE

## 2023-12-08 ASSESSMENT — PAIN SCALES - GENERAL: PAINLEVEL: NO PAIN (0)

## 2023-12-08 NOTE — COMMUNITY RESOURCES LIST (ENGLISH)
12/08/2023   [facility]  N/A  For questions about this resource list or additional care needs, please contact your primary care clinic or care manager.  Phone: [phone]   Email: N/A   Address: [address] [place] [id number]   Hours: N/A        Hotlines and Helplines       Hotline - Housing crisis  1  [facility] Distance: 13.7 miles      Phone/Virtual   [address] [place] [address]  Language: English  Hours: Mon - Sun Open 24 Hours   Phone: (673) 595-1313 Email: communications@[facility].org Website: https://[facility].org/[facility]avioursh[facility]/     2  [facility] Distance: 15.29 miles      Phone/Virtual   [address] [place] [address]  Language: English  Hours: Mon - Sun Open 24 Hours   Phone: (798) 413-5114 Email: info@[facility].[employer] Website: http://www.[facility].org          Housing       Coordinated Entry access point  3  [facility] Distance: 6.8 miles      In-Person, Phone/Virtual   [address] [place] [address]  Language: English  Hours: Mon - Fri 8:30 AM - 4:30 PM  Fees: Free   Phone: (540) 297-7682 Email: info@[facility].org Website: https://www.[facility].org/locations/[facility]/     4  [facility] - Coordinated Access to Housing and Shelter (CAHS) - Coordinated Access - Coordinated Entry access point Distance: 8.75 miles      In-Person, Phone/Virtual   [address] [place] [address]  Language: English  Hours: Mon - Fri 8:00 AM - 4:30 PM  Fees: Free   Phone: (485) 711-7756 Website: https://www.[facility]./residents/assistance-support/assistance/housing-services-support     Drop-in center or day shelter  5  [facility] Distance: 5.23 miles      In-Person   [address] [place] [address]  Language: English  Hours: Mon - Fri 9:00 AM - 4:00 PM  Fees: Free   Phone: (296) 353-4264 Email: shaheed@[facility].org Website:  http://[facility].org     6  [facility] Distance: 13.65 miles      In-Person   [address] [place] [address]  Language: English, [place]guillan, Citizen of [place]  Hours: Mon - Sat 7:00 AM - 3:00 PM  Fees: Free, Self Pay   Phone: (946) 705-1967 Email: info@[facility].[employer] Website: https://www.[facility].[employer]/locations/opportunity-center/     Housing search assistance  7  [facility] - Housing Search Assistance Distance: 6.66 miles      Phone/Virtual   [address] [place] [address]  Language: Mongolian, English, Hmong, Heather, [place]guillan, Citizen of [place]  Hours: Mon - Fri Appt. Only  Fees: Free   Phone: (780) 425-6355 Website: https://[employer]/     8  [facility] [place] - Mental Health Crisis Housing Search Assistance Distance: 9.91 miles      In-Person, Phone/Virtual   [address] [place] [address]  Language: English  Hours: Mon - Tue 8:00 AM - 4:30 PM , Wed 8:00 AM - 6:00 PM , Thu - Fri 8:00 AM - 4:30 PM  Fees: Free   Phone: (757) 554-4502 Email: [facility]. Website: https://www.[facility]./[facility]/health-medical/clinics-services/mental-health/adult-mental-health     Shelter for families  9  [facility] Distance: 10.59 miles      In-Person   [address] [place] [address]  Language: English  Hours: Mon - Fri 3:00 PM - 9:00 AM , Sat - Sun Open 24 Hours  Fees: Free   Phone: (486) 618-4321 Ext.1 Website: https://www.[facility].org/2020/07/03/emergency-family-shelter/     10  [facility] Distance: 23.01 miles      In-Person   [address] [place] [address]  Language: English  Hours: Mon - Sun Open 24 Hours  Fees: Free, Self Pay   Phone: (666) 172-6178 Email: Johnny@[facility].org Website: http://www.[facility].org/     Shelter for individuals  [address] [place] [address] and [place] -  [facility] [place] [facility] [place] [facility] Distance: 6.83 miles      In-Person   [address] [place] [address]  Language: English  Hours: Mon - Sun 5:00 PM - 10:00 AM  Fees: Free, Self Pay   Phone: (988) 589-9532 Email: info@[facility] Website: https://www.[facility]/locations/[facility]/     12  [facility] - Coordinated Access to Housing and Shelter (CAHS) - Coordinated Access - Emergency housing Distance: 8.75 miles      In-Person, Phone/Virtual   [address] [place] [address]  Language: English  Hours: Mon - Fri 8:00 AM - 4:30 PM  Fees: Free   Phone: (121) 196-9899 Website: https://www.[facility]./residents/assistance-support/assistance/housing-services-support          Important Numbers & Websites       Emergency Services   911  [facility]   311  Poison Control   (401) 282-3835  Suicide Prevention Lifeline   (265) 302-1710 (TALK)  Child Abuse Hotline   (767) 915-7456 (4-A-Child)  Sexual Assault Hotline   (717) 629-5902 (HOPE)  National Runaway Safeline   (429) 729-8423 (RUNAWAY)  All-Options Talkline   (681) 550-8723  Substance Abuse Referral   (358) 309-4331 (HELP)

## 2023-12-08 NOTE — PROGRESS NOTES
"Ankush Zaidi  /80   Pulse 77   Resp 18   Ht 1.753 m (5' 9\")   Wt 88.9 kg (196 lb)   SpO2 98%   BMI 28.94 kg/m       Assessment/Plan:                Ankush was seen today for recheck medication, diabetes and arthritis.    Diagnoses and all orders for this visit:    Type 2 diabetes mellitus with other specified complication, without long-term current use of insulin (H)  -     Hemoglobin A1c; Future    Hyperlipidemia, unspecified hyperlipidemia type    Pain of finger of right hand  -     Adult Rheumatology  Referral; Future    Antinuclear antibody (VINITA) titer greater than 1:80  -     Adult Rheumatology  Referral; Future  -     CRP, inflammation; Future  -     Anti Nuclear Genet IgG by IFA with Reflex; Future         DISCUSSION  The etiology of his hand pain is not clear.  He has definitive discomfort in the fingers through the palmar surface of the hand but there is no visible abnormality such as joint swelling or redness.  We discussed repeating some basic labs and trying to get him set up to see a rheumatologist to aid in further planning.      Subjective:     HPI:    Ankush Zaidi is a 73 year old male has a medical history including diabetes and hyperlipidemia.  He is here today to follow-up on pain and weakness of the second and third digits of the right hand.  Patient states about 2 months ago after pressure washing his garage and bowling he suddenly developed significant pain and weakness of primarily the second and third digits of the right hand.  The pain is present in the metacarpal phalangeal joint region into the fingers as well as into the more proximal aspect of the second and third metacarpals.  He states he was seen at the [facility].  The specialist ordered laboratory testing and the results are viewable but I do not see the overall report from the visit and his record at this time.  Laboratory test showed an elevated VINITA titer at 1-320.  It is slight elevation in " CRP but otherwise labs were normal this included rheumatoid factor, uric acid, Lyme disease titer, thyroid studies, sedimentation rate, CBC.    He states he was treated with courses of steroid orally, topical NSAIDs and other topical treatments with no improvement in symptoms.  He did note elevation in blood sugars while on oral steroid.    He states he continues now 2 months later to have pain and weakness in this area of his hand and fingers.  He does have a follow-up scheduled with the orthopedic specialist but not for several weeks.    ROS:  Complete review of systems is obtained.  Other than the specific considerations noted above complete review of systems is negative.          Objective:   Medications:  Current Outpatient Medications   Medication    aspirin 81 MG EC tablet    glipiZIDE (GLUCOTROL XL) 5 MG 24 hr tablet    metFORMIN (GLUCOPHAGE) 500 MG tablet    simvastatin (ZOCOR) 20 MG tablet     No current facility-administered medications for this visit.        Allergies:     Allergies   Allergen Reactions    Voltaren [Diclofenac] Unknown     Blood in urine        Social History     Socioeconomic History    Marital status:      Spouse name: Not on file    Number of children: Not on file    Years of education: Not on file    Highest education level: Not on file   Occupational History    Not on file   Tobacco Use    Smoking status: Former     Types: Cigarettes     Quit date: 1969     Years since quittin.2    Smokeless tobacco: Never   Substance and Sexual Activity    Alcohol use: Yes     Alcohol/week: 3.3 standard drinks of alcohol    Drug use: No    Sexual activity: Not on file   Other Topics Concern    Not on file   Social History Narrative    Not on file     Social Determinants of Health     Financial Resource Strain: Low Risk  (2023)    Financial Resource Strain     Within the past 12 months, have you or your family members you live with been unable to get utilities (heat,  electricity) when it was really needed?: No   Food Insecurity: Low Risk  (12/8/2023)    Food Insecurity     Within the past 12 months, did you worry that your food would run out before you got money to buy more?: No     Within the past 12 months, did the food you bought just not last and you didn t have money to get more?: No   Transportation Needs: Low Risk  (12/8/2023)    Transportation Needs     Within the past 12 months, has lack of transportation kept you from medical appointments, getting your medicines, non-medical meetings or appointments, work, or from getting things that you need?: No   Physical Activity: Not on file   Stress: Not on file   Social Connections: Not on file   Interpersonal Safety: Not on file   Housing Stability: High Risk (12/8/2023)    Housing Stability     Do you have housing? : No     Are you worried about losing your housing?: No       No family history on file.     Most Recent Immunizations   Administered Date(s) Administered    COVID-19 Bivalent 12+ (Pfizer) 10/19/2022    COVID-19 MONOVALENT 12+ (Pfizer) 11/22/2021    Influenza (High Dose) 3 valent vaccine 11/01/2020    Influenza (IIV3) PF 11/12/2008    Influenza Vaccine 65+ (FLUAD) 10/25/2023    Influenza Vaccine 65+ (Fluzone HD) 01/07/2022    Influenza Vaccine, 6+MO IM (QUADRIVALENT W/PRESERVATIVES) 11/04/2019    Pneumo Conj 13-V (2010&after) 07/10/2019    Pneumococcal 23 valent 06/16/2010    TDAP (Adacel,Boostrix) 07/10/2019    Td (Adult), Adsorbed 07/30/1999    Zoster recombinant adjuvanted (SHINGRIX) 09/22/2022        Wt Readings from Last 3 Encounters:   12/08/23 88.9 kg (196 lb)   08/08/23 87.1 kg (192 lb)   09/16/22 86.2 kg (190 lb)        BP Readings from Last 6 Encounters:   12/08/23 134/80   08/08/23 128/76   09/16/22 128/76   03/24/22 (!) 152/82   03/03/22 118/70   02/10/22 (!) 141/82        Hemoglobin A1C   Date Value Ref Range Status   08/08/2023 7.3 (H) 0.0 - 5.6 % Final     Comment:     Normal <5.7%   Prediabetes  "5.7-6.4%    Diabetes 6.5% or higher     Note: Adopted from ADA consensus guidelines.   03/01/2023 7.4 (H) 0.0 - 5.6 % Final     Comment:     Normal <5.7%   Prediabetes 5.7-6.4%    Diabetes 6.5% or higher     Note: Adopted from ADA consensus guidelines.   08/23/2022 7.2 (H) 0.0 - 5.6 % Final     Comment:     Normal <5.7%   Prediabetes 5.7-6.4%    Diabetes 6.5% or higher     Note: Adopted from ADA consensus guidelines.        PHYSICAL EXAM:    /80   Pulse 77   Resp 18   Ht 1.753 m (5' 9\")   Wt 88.9 kg (196 lb)   SpO2 98%   BMI 28.94 kg/m       General: Patient is alert no signs of distress    Examination of his hand reveals no bruising redness swelling or obvious visual abnormalities.  There is tenderness at the second metacarpal phalangeal joint and toe more mild extent the third.  Pain is present on palpation more moving proximally along the metacarpal of the second and third metacarpals.  There is weakness of the second phalangeal joint in terms of flexion especially.                          Answers submitted by the patient for this visit:  General Questionnaire (Submitted on 12/8/2023)  Chief Complaint: Chronic problems general questions HPI Form  How many servings of fruits and vegetables do you eat daily?: 0-1  On average, how many sweetened beverages do you drink each day (Examples: soda, juice, sweet tea, etc.  Do NOT count diet or artificially sweetened beverages)?: 1  How many minutes a day do you exercise enough to make your heart beat faster?: 9 or less  How many days a week do you exercise enough to make your heart beat faster?: 3 or less  How many days per week do you miss taking your medication?: 0  General Concern (Submitted on 12/8/2023)  Chief Complaint: Chronic problems general questions HPI Form  What is the reason for your visit today?: hand problems  When did your symptoms begin?: 3-4 weeks ago    "

## 2023-12-11 ENCOUNTER — TRANSCRIBE ORDERS (OUTPATIENT)
Dept: OTHER | Age: 73
End: 2023-12-11

## 2023-12-11 DIAGNOSIS — S69.90XA FINGER INJURY: Primary | ICD-10-CM

## 2023-12-12 LAB
ANA PAT SER IF-IMP: ABNORMAL
ANA SER QL IF: POSITIVE
ANA TITR SER IF: ABNORMAL {TITER}

## 2024-01-28 ENCOUNTER — HEALTH MAINTENANCE LETTER (OUTPATIENT)
Age: 74
End: 2024-01-28

## 2024-02-21 DIAGNOSIS — E11.9 DIABETES MELLITUS, TYPE 2 (H): ICD-10-CM

## 2024-02-21 DIAGNOSIS — E11.69 TYPE 2 DIABETES MELLITUS WITH OTHER SPECIFIED COMPLICATION, WITHOUT LONG-TERM CURRENT USE OF INSULIN (H): ICD-10-CM

## 2024-02-21 RX ORDER — GLIPIZIDE 5 MG/1
TABLET, FILM COATED, EXTENDED RELEASE ORAL
Qty: 90 TABLET | Refills: 0 | Status: SHIPPED | OUTPATIENT
Start: 2024-02-21 | End: 2024-03-25

## 2024-02-21 NOTE — TELEPHONE ENCOUNTER
Pending Prescriptions:                       Disp   Refills    metFORMIN (GLUCOPHAGE) 500 MG tablet      180 ta*1            Sig: Take 1 tablet (500 mg) by mouth 2 times daily           (with meals)    glipiZIDE (GLUCOTROL XL) 5 MG 24 hr [id number] tab*1            Sig: Take 1 tablet by mouth once daily with breakfast

## 2024-03-18 RX ORDER — COLCHICINE 0.6 MG/1
0.6 TABLET ORAL DAILY
COMMUNITY
Start: 2024-01-18 | End: 2024-03-25

## 2024-03-25 ENCOUNTER — OFFICE VISIT (OUTPATIENT)
Dept: FAMILY MEDICINE | Facility: CLINIC | Age: 74
End: 2024-03-25
Payer: COMMERCIAL

## 2024-03-25 VITALS
OXYGEN SATURATION: 98 % | HEIGHT: 69 IN | TEMPERATURE: 98.6 F | HEART RATE: 98 BPM | WEIGHT: 192.3 LBS | BODY MASS INDEX: 28.48 KG/M2 | SYSTOLIC BLOOD PRESSURE: 134 MMHG | RESPIRATION RATE: 18 BRPM | DIASTOLIC BLOOD PRESSURE: 78 MMHG

## 2024-03-25 DIAGNOSIS — H61.23 BILATERAL IMPACTED CERUMEN: ICD-10-CM

## 2024-03-25 DIAGNOSIS — M79.644 PAIN OF FINGER OF RIGHT HAND: ICD-10-CM

## 2024-03-25 DIAGNOSIS — M79.641 PAIN OF RIGHT HAND: ICD-10-CM

## 2024-03-25 DIAGNOSIS — E11.69 TYPE 2 DIABETES MELLITUS WITH OTHER SPECIFIED COMPLICATION, WITHOUT LONG-TERM CURRENT USE OF INSULIN (H): ICD-10-CM

## 2024-03-25 DIAGNOSIS — L91.8 SKIN TAG: ICD-10-CM

## 2024-03-25 DIAGNOSIS — Z00.00 MEDICARE ANNUAL WELLNESS VISIT, SUBSEQUENT: Primary | ICD-10-CM

## 2024-03-25 DIAGNOSIS — E78.5 HYPERLIPIDEMIA, UNSPECIFIED HYPERLIPIDEMIA TYPE: ICD-10-CM

## 2024-03-25 LAB
ALBUMIN SERPL BCG-MCNC: 4.3 G/DL (ref 3.5–5.2)
ALP SERPL-CCNC: 84 U/L (ref 40–150)
ALT SERPL W P-5'-P-CCNC: 22 U/L (ref 0–70)
ANION GAP SERPL CALCULATED.3IONS-SCNC: 15 MMOL/L (ref 7–15)
AST SERPL W P-5'-P-CCNC: 20 U/L (ref 0–45)
BILIRUB SERPL-MCNC: 0.5 MG/DL
BUN SERPL-MCNC: 22.5 MG/DL (ref 8–23)
CALCIUM SERPL-MCNC: 9.2 MG/DL (ref 8.8–10.2)
CHLORIDE SERPL-SCNC: 104 MMOL/L (ref 98–107)
CHOLEST SERPL-MCNC: 134 MG/DL
CREAT SERPL-MCNC: 1.06 MG/DL (ref 0.67–1.17)
DEPRECATED HCO3 PLAS-SCNC: 20 MMOL/L (ref 22–29)
EGFRCR SERPLBLD CKD-EPI 2021: 74 ML/MIN/1.73M2
FASTING STATUS PATIENT QL REPORTED: YES
GLUCOSE SERPL-MCNC: 260 MG/DL (ref 70–99)
HBA1C MFR BLD: 9.9 % (ref 0–5.6)
HDLC SERPL-MCNC: 38 MG/DL
LDLC SERPL CALC-MCNC: 74 MG/DL
NONHDLC SERPL-MCNC: 96 MG/DL
POTASSIUM SERPL-SCNC: 4.2 MMOL/L (ref 3.4–5.3)
PROT SERPL-MCNC: 7.2 G/DL (ref 6.4–8.3)
SODIUM SERPL-SCNC: 139 MMOL/L (ref 135–145)
TRIGL SERPL-MCNC: 108 MG/DL

## 2024-03-25 PROCEDURE — G0438 PPPS, INITIAL VISIT: HCPCS | Performed by: FAMILY MEDICINE

## 2024-03-25 PROCEDURE — 80053 COMPREHEN METABOLIC PANEL: CPT | Performed by: FAMILY MEDICINE

## 2024-03-25 PROCEDURE — 80061 LIPID PANEL: CPT | Performed by: FAMILY MEDICINE

## 2024-03-25 PROCEDURE — 36415 COLL VENOUS BLD VENIPUNCTURE: CPT | Performed by: FAMILY MEDICINE

## 2024-03-25 PROCEDURE — 83036 HEMOGLOBIN GLYCOSYLATED A1C: CPT | Performed by: FAMILY MEDICINE

## 2024-03-25 RX ORDER — GLIPIZIDE 10 MG/1
10 TABLET, FILM COATED, EXTENDED RELEASE ORAL DAILY
Qty: 90 TABLET | Refills: 3 | Status: SHIPPED | OUTPATIENT
Start: 2024-03-25 | End: 2024-06-17

## 2024-03-25 SDOH — HEALTH STABILITY: PHYSICAL HEALTH: ON AVERAGE, HOW MANY DAYS PER WEEK DO YOU ENGAGE IN MODERATE TO STRENUOUS EXERCISE (LIKE A BRISK WALK)?: 2 DAYS

## 2024-03-25 ASSESSMENT — ACTIVITIES OF DAILY LIVING (ADL)
DIFFICULTY_COMMUNICATING: NO
CHANGE_IN_FUNCTIONAL_STATUS_SINCE_ONSET_OF_CURRENT_ILLNESS/INJURY: NO
FALL_HISTORY_WITHIN_LAST_SIX_MONTHS: NO
HEARING_DIFFICULTY_OR_DEAF: NO
CONCENTRATING,_REMEMBERING_OR_MAKING_DECISIONS_DIFFICULTY: NO
WALKING_OR_CLIMBING_STAIRS_DIFFICULTY: NO
DRESSING/BATHING_DIFFICULTY: NO
WEAR_GLASSES_OR_BLIND: YES
DOING_ERRANDS_INDEPENDENTLY_DIFFICULTY: NO
DIFFICULTY_EATING/SWALLOWING: NO
TOILETING_ISSUES: NO

## 2024-03-25 ASSESSMENT — SOCIAL DETERMINANTS OF HEALTH (SDOH): HOW OFTEN DO YOU GET TOGETHER WITH FRIENDS OR RELATIVES?: TWICE A WEEK

## 2024-03-25 ASSESSMENT — PAIN SCALES - GENERAL: PAINLEVEL: NO PAIN (0)

## 2024-03-25 NOTE — PROGRESS NOTES
"Preventive Care Visit  [facility]  Lance Curry MD, MD, Family Medicine  Mar 25, 2024      Assessment & Plan     Ankush was seen today for recheck medication, diabetes and wellness visit.    Diagnoses and all orders for this visit:    Medicare annual wellness visit, subsequent    Type 2 diabetes mellitus with other specified complication, without long-term current use of insulin (H)  -     Hemoglobin A1c; Future  -     Comprehensive metabolic panel (BMP + Alb, Alk Phos, ALT, AST, Total. Bili, TP); Future  -     Hemoglobin A1c  -     Comprehensive metabolic panel (BMP + Alb, Alk Phos, ALT, AST, Total. Bili, TP)  -     glipiZIDE (GLUCOTROL XL) 10 MG 24 hr tablet; Take 1 tablet (10 mg) by mouth daily    Hyperlipidemia, unspecified hyperlipidemia type  -     Lipid panel reflex to direct LDL Fasting; Future  -     Lipid panel reflex to direct LDL Fasting    Pain of finger of right hand    Bilateral impacted cerumen    Pain of right hand    Skin tag           Patient has been advised of split billing requirements and indicates understanding: Yes        BMI  Estimated body mass index is 28.4 kg/m  as calculated from the following:    Height as of this encounter: 1.753 m (5' 9\").    Weight as of this encounter: 87.2 kg (192 lb 4.8 oz).       Counseling  Appropriate preventive services were discussed with this patient, including applicable screening as appropriate for fall prevention, nutrition, physical activity, Tobacco-use cessation, weight loss and cognition.  Checklist reviewing preventive services available has been given to the patient.  Reviewed patient's diet, addressing concerns and/or questions.   He is at risk for lack of exercise and has been provided with information to increase physical activity for the benefit of his well-being.           Subjective   Ankush is a 73 year old, presenting for the following:  Recheck Medication, Diabetes, and Wellness Visit    He has type 2 diabetes.  Is on " metformin glipizide.  Higher doses of metformin have resulted in gastrointestinal upset.  Patient has had hyperglycemia initially attributed this to being on prednisone for a musculoskeletal issue for which she saw orthopedic specialist and a rheumatologist regarding his right hand.  He describes being on 2 courses of prednisone for greater than 1 week in duration each.  Also describes a corticosteroid shot.  No prednisone use since mid February but blood sugars remaining elevated often 2 .  Patient having polyuria.  Discussed ways to get blood sugar under control.  Discussed increasing glipizide.  Discussed close monitoring.  Discussed need for potential further adjustments.  Up-to-date with eye exam no retinopathy.  No history of kidney disease or microalbuminuria.  Microalbumin obtained in August 2023.  Foot exam performed in August 2023, no neuropathy history or symptoms reported at this time.  He is on appropriate medications for vascular disease risk reduction.  Will begin taking a baby aspirin per previous conversation for further risk reduction.  Discussed briefly potential to use GLP-1 agonist such as Ozempic.    He is on simvastatin for cholesterol lowering.    His hand issue is better for the time being.  Still having difficulty being available but not having pain at baseline otherwise we will consider follow-up with rheumatologist and orthopedic specialist for further workup as had been discussed with them.    He has bilateral impacted cerumen.    He has 3 skin tags on the chest that he would like addressed as they are causing pain due to irritation.    Reviewed health screening.  Discussed immunizations, declines RSV and COVID at this time.  Otherwise generally up-to-date.  Could consider pneumonia booster dose at some point in the future.    He is up-to-date with colon cancer screening based on a Cologuard test obtained in August 2023 that was negative.    Today we discussed health care  directive.  Discussed other aspects of routine health prevention.          3/25/2024     9:12 AM   Additional Questions   Roomed by am cma         Health Care Directive  Patient does not have a Health Care Directive or Living Will: Discussed advance care planning with patient; however, patient declined at this time.                  3/25/2024   General Health   How would you rate your overall physical health? Good   Feel stress (tense, anxious, or unable to sleep) Not at all         3/25/2024   Nutrition   Diet: Regular (no restrictions)         3/25/2024   Exercise   Days per week of moderate/strenous exercise 2 days   (!) EXERCISE CONCERN      3/25/2024   Social Factors   Frequency of gathering with friends or relatives Twice a week   Worry food won't last until get money to buy more No   Food not last or not have enough money for food? No   Do you have housing?  Yes   Are you worried about losing your housing? No   Lack of transportation? No   Unable to get utilities (heat,electricity)? No         3/25/2024   Fall Risk   Fallen 2 or more times in the past year? No   Trouble with walking or balance? No          3/25/2024   Activities of Daily Living- Home Safety   Needs help with the following daily activites None of the above   Safety concerns in the home None of the above         3/25/2024   Dental   Dentist two times every year? Yes         3/25/2024   Hearing Screening   Hearing concerns? None of the above         3/25/2024   Driving Risk Screening   Patient/family members have concerns about driving No         3/25/2024   General Alertness/Fatigue Screening   Have you been more tired than usual lately? No         3/25/2024   Urinary Incontinence Screening   Bothered by leaking urine in past 6 months No         3/25/2024   TB Screening   Were you born outside of the [place]? No         Today's PHQ-2 Score:       3/25/2024     9:11 AM   PHQ-2 ( 1999 Pfizer)   Q1: Little interest or pleasure in doing things 0   Q2:  Feeling down, depressed or hopeless 0   PHQ-2 Score 0           3/25/2024   Substance Use   Alcohol more than 3/day or more than 7/wk No   Do you have a current opioid prescription? No   How severe/bad is pain from 1 to 10? 0/10 (No Pain)   Do you use any other substances recreationally? No     Social History     Tobacco Use    Smoking status: Former     Types: Cigarettes     Quit date: 1969     Years since quittin.5    Smokeless tobacco: Never   Substance Use Topics    Alcohol use: Yes     Alcohol/week: 3.3 standard drinks of alcohol    Drug use: No           3/25/2024   AAA Screening   Family history of Abdominal Aortic Aneurysm (AAA)? No   ASCVD Risk   The 10-year ASCVD risk score (Crystal MEJÍA, et al., 2019) is: 39.8%    Values used to calculate the score:      Age: 73 years      Sex: Male      Is Non- : No      Diabetic: Yes      Tobacco smoker: No      Systolic Blood Pressure: 134 mmHg      Is BP treated: No      HDL Cholesterol: 39 mg/dL      Total Cholesterol: 151 mg/dL            Reviewed and updated as needed this visit by Provider                      Current providers sharing in care for this patient include:  Patient Care Team:  Lance Curry MD as PCP - General  Lance Curry MD as Assigned PCP    The following health maintenance items are reviewed in Epic and correct as of today:  Health Maintenance   Topic Date Due    DIABETIC FOOT EXAM  Never done    ANNUAL REVIEW OF  ORDERS  Never done    ADVANCE CARE PLANNING  Never done    HEPATITIS C SCREENING  Never done    RSV VACCINE (Pregnancy & 60+) (1 - 1-dose 60+ series) Never done    MEDICARE ANNUAL WELLNESS VISIT  2015    AORTIC ANEURYSM SCREENING (SYSTEM ASSIGNED)  Never done    Pneumococcal Vaccine: 65+ Years (3 of 3 - PPSV23 or PCV20) 07/10/2020    LIPID  2023    COVID-19 Vaccine (2023- season) 2023    BMP  2024    MICROALBUMIN  2024    A1C  2024    EYE EXAM   "11/06/2024    FALL RISK ASSESSMENT  03/25/2025    COLORECTAL CANCER SCREENING  08/16/2026    DTAP/TDAP/TD IMMUNIZATION (4 - Td or Tdap) 07/10/2029    PHQ-2 (once per calendar year)  Completed    INFLUENZA VACCINE  Completed    ZOSTER IMMUNIZATION  Completed    IPV IMMUNIZATION  Aged Out    HPV IMMUNIZATION  Aged Out    MENINGITIS IMMUNIZATION  Aged Out    RSV MONOCLONAL ANTIBODY  Aged Out       Complete review of systems is obtained.  Other than the specific considerations noted above complete review of systems is negative.       Objective    Exam  /78   Pulse 98   Temp 98.6  F (37  C)   Resp 18   Ht 1.753 m (5' 9\")   Wt 87.2 kg (192 lb 4.8 oz)   SpO2 98%   BMI 28.40 kg/m     Estimated body mass index is 28.4 kg/m  as calculated from the following:    Height as of this encounter: 1.753 m (5' 9\").    Weight as of this encounter: 87.2 kg (192 lb 4.8 oz).    Physical Exam        General Appearance:    Alert, cooperative, no distress   Eyes:   No scleral icterus or conjunctival irritation       Ears:    Normal TM's and external ear canals, both ears   Throat:   Lips, mucosa, and tongue normal; teeth and gums normal   Neck:   Supple, symmetrical, trachea midline, no adenopathy;        thyroid:  No enlargement/tenderness/nodules   Lungs:     Clear to auscultation bilaterally, respirations unlabored, no wheezes or crackles   Heart:    Regular rate and rhythm,  No murmur   Abdomen:    Soft, no distention, no tenderness on palpation, no masses, no organomegaly     Extremities:  No edema, no joint swelling or redness, no evidence of any injuries 3 benign-appearing skin tags on the anterior chest region.   Skin:  No concerning skin findings, no suspicious moles, no rashes   Neurologic:  On gross examination there is no motor or sensory deficit.  Patient walks with a normal gait       PROCEDURE NOTE: In the examination room utilizing liquid nitrogen from a spray dispensing canister the 3 describe skin tags were " frozen.  Each was frozen taking care to avoid freezing surrounding tissue a total of 3 times and allowed to thaw briefly between.  The of the typical appearance of cryotherapy with mild irritation following but no complications otherwise.            3/25/2024   Mini Cog   Clock Draw Score 2 Normal    2 Normal   3 Item Recall 3 objects recalled    3 objects recalled   Mini Cog Total Score 5    5              Signed Electronically by: Lance Curry MD, MD

## 2024-05-20 DIAGNOSIS — E11.69 TYPE 2 DIABETES MELLITUS WITH OTHER SPECIFIED COMPLICATION, WITHOUT LONG-TERM CURRENT USE OF INSULIN (H): ICD-10-CM

## 2024-05-20 NOTE — TELEPHONE ENCOUNTER
Pending Prescriptions:                       Disp   Refills    metFORMIN (GLUCOPHAGE) 500 MG tablet      180 ta*0            Sig: Take 1 tablet (500 mg) by mouth 2 times daily           (with meals)

## 2024-06-17 ENCOUNTER — OFFICE VISIT (OUTPATIENT)
Dept: FAMILY MEDICINE | Facility: CLINIC | Age: 74
End: 2024-06-17
Payer: COMMERCIAL

## 2024-06-17 VITALS
DIASTOLIC BLOOD PRESSURE: 76 MMHG | HEART RATE: 80 BPM | RESPIRATION RATE: 18 BRPM | BODY MASS INDEX: 28.73 KG/M2 | OXYGEN SATURATION: 98 % | HEIGHT: 69 IN | SYSTOLIC BLOOD PRESSURE: 132 MMHG | WEIGHT: 194 LBS | TEMPERATURE: 98 F

## 2024-06-17 DIAGNOSIS — E11.69 TYPE 2 DIABETES MELLITUS WITH OTHER SPECIFIED COMPLICATION, WITHOUT LONG-TERM CURRENT USE OF INSULIN (H): ICD-10-CM

## 2024-06-17 DIAGNOSIS — L98.9 SKIN LESION: Primary | ICD-10-CM

## 2024-06-17 PROCEDURE — 99214 OFFICE O/P EST MOD 30 MIN: CPT | Performed by: FAMILY MEDICINE

## 2024-06-17 RX ORDER — GLIPIZIDE 10 MG/1
10 TABLET, FILM COATED, EXTENDED RELEASE ORAL 2 TIMES DAILY
Status: SHIPPED
Start: 2024-06-17 | End: 2024-07-03

## 2024-06-17 ASSESSMENT — PAIN SCALES - GENERAL: PAINLEVEL: NO PAIN (0)

## 2024-06-17 NOTE — PROGRESS NOTES
"Ankush Zaidi  /76   Pulse 80   Temp 98  F (36.7  C)   Resp 18   Ht 1.753 m (5' 9\")   Wt 88 kg (194 lb)   SpO2 98%   BMI 28.65 kg/m       Assessment/Plan:                Ankush was seen today for recheck medication.    Diagnoses and all orders for this visit:    Skin lesion  -     Adult Dermatology  Referral; Future    Type 2 diabetes mellitus with other specified complication, without long-term current use of insulin (H)  -     glipiZIDE (GLUCOTROL XL) 10 MG 24 hr tablet; Take 1 tablet (10 mg) by mouth 2 times daily         DISCUSSION  See discussion below.  Referral to dermatology for evaluation of the skin lesion.    Increase glipizide to 10 mg twice daily.  Continue metformin at current dose monitor sugars closely.  Subjective:     HPI:    Ankush Zaidi is a 74 year old male he is here today primarily to discuss concerns regarding a skin lesion on his scalp.  He states recently his dentist pointed out that he has a pigmented skin lesion the crown of his scalp.  He cannot see the area to determine if it is changed.  It does not been noted as concern prior.  The area consists of segmentation it is flat.  He has no prior history of skin cancer or significant skin concerns.    He has type 2 diabetes.  He is on metformin current dose 500 regular release twice daily.  Attempts at higher doses have led to digestive system side effects.    Last A1c was 9.9 obtained in March.  Patient reports continued high blood sugars despite addition of 10 mg extended release glipizide, it was increased from 5 mg prior.  He is reporting postmeal blood sugars that are in the mid 200 range.  Fasting numbers can often be as high as 200 as well.  No history of hypoglycemia, lowest reported recorded blood sugar since last visit was 135.    Today we discussed options for altering medication treatment for better managing diabetes which clearly needs to take place.  We talked about the potential risk of worsening " gastrointestinal symptoms with increasing dose of metformin elected to forego adjusting this medication for the time being.  Discussed the potential dangers associated with increasing the glipizide leading to hypoglycemia.  Despite this risk we feel that based on his current numbers that is reasonable to do so he will monitor closely and be prepared to deal with any hypoglycemia should it occur and notify me immediately so we can consider other options.    ROS:  Complete review of systems is obtained.  Other than the specific considerations noted above complete review of systems is negative.          Objective:   Medications:  Current Outpatient Medications   Medication Sig Dispense Refill    aspirin 81 MG EC tablet Take 81 mg by mouth daily      glipiZIDE (GLUCOTROL XL) 10 MG 24 hr tablet Take 1 tablet (10 mg) by mouth 2 times daily      metFORMIN (GLUCOPHAGE) 500 MG tablet Take 1 tablet (500 mg) by mouth 2 times daily (with meals) 180 tablet 0    simvastatin (ZOCOR) 20 MG tablet [SIMVASTATIN (ZOCOR) 20 MG TABLET] TAKE 1 TABLET BY MOUTH AT BEDTIME 90 tablet 3     No current facility-administered medications for this visit.        Allergies:   No Known Allergies     Social History     Socioeconomic History    Marital status:      Spouse name: Not on file    Number of children: Not on file    Years of education: Not on file    Highest education level: Not on file   Occupational History    Not on file   Tobacco Use    Smoking status: Former     Current packs/day: 0.00     Types: Cigarettes     Quit date: 1969     Years since quittin.8    Smokeless tobacco: Never   Substance and Sexual Activity    Alcohol use: Yes     Alcohol/week: 3.3 standard drinks of alcohol    Drug use: No    Sexual activity: Not on file   Other Topics Concern    Not on file   Social History Narrative    Not on file     Social Determinants of Health     Financial Resource Strain: Low Risk  (3/25/2024)    Financial Resource Strain      Within the past 12 months, have you or your family members you live with been unable to get utilities (heat, electricity) when it was really needed?: No   Food Insecurity: Low Risk  (3/25/2024)    Food Insecurity     Within the past 12 months, did you worry that your food would run out before you got money to buy more?: No     Within the past 12 months, did the food you bought just not last and you didn t have money to get more?: No   Transportation Needs: Low Risk  (3/25/2024)    Transportation Needs     Within the past 12 months, has lack of transportation kept you from medical appointments, getting your medicines, non-medical meetings or appointments, work, or from getting things that you need?: No   Physical Activity: Unknown (3/25/2024)    Exercise Vital Sign     Days of Exercise per Week: 2 days     Minutes of Exercise per Session: Not on file   Stress: No Stress Concern Present (3/25/2024)    [employer] [place] [place] [employer] - Occupational Stress Questionnaire     Feeling of Stress : Not at all   Social Connections: Unknown (3/25/2024)    Social Connection and Isolation Panel [NHANES]     Frequency of Communication with Friends and Family: Not on file     Frequency of Social Gatherings with Friends and Family: Twice a week     Attends Scientologist Services: Not on file     Active Member of Clubs or Organizations: Not on file     Attends Club or Organization Meetings: Not on file     Marital Status: Not on file   Interpersonal Safety: Low Risk  (3/25/2024)    Interpersonal Safety     Do you feel physically and emotionally safe where you currently live?: Yes     Within the past 12 months, have you been hit, slapped, kicked or otherwise physically hurt by someone?: No     Within the past 12 months, have you been humiliated or emotionally abused in other ways by your partner or ex-partner?: No   Housing Stability: Low Risk  (3/25/2024)    Housing Stability     Do you have housing? : Yes     Are you  "worried about losing your housing?: No       No family history on file.     Most Recent Immunizations   Administered Date(s) Administered    COVID-19 Bivalent 12+ (Pfizer) 10/19/2022    COVID-19 MONOVALENT 12+ (Pfizer) 11/22/2021    Influenza (High Dose) 3 valent vaccine 11/01/2020    Influenza (IIV3) PF 11/12/2008    Influenza Vaccine 65+ (FLUAD) 10/25/2023    Influenza Vaccine 65+ (Fluzone HD) 01/07/2022    Influenza Vaccine, 6+MO IM (QUADRIVALENT W/PRESERVATIVES) 11/04/2019    Pneumo Conj 13-V (2010&after) 07/10/2019    Pneumococcal 23 valent 06/16/2010    TDAP (Adacel,Boostrix) 07/10/2019    Td (Adult), Adsorbed 07/30/1999    Zoster recombinant adjuvanted (SHINGRIX) 09/22/2022        Wt Readings from Last 3 Encounters:   06/17/24 88 kg (194 lb)   03/25/24 87.2 kg (192 lb 4.8 oz)   12/08/23 88.9 kg (196 lb)        BP Readings from Last 6 Encounters:   06/17/24 132/76   03/25/24 134/78   12/08/23 134/80   08/08/23 128/76   09/16/22 128/76   03/24/22 (!) 152/82        Hemoglobin A1C   Date Value Ref Range Status   03/25/2024 9.9 (H) 0.0 - 5.6 % Final   12/08/2023 8.7 (H) 0.0 - 5.6 % Final   08/08/2023 7.3 (H) 0.0 - 5.6 % Final     Comment:     Normal <5.7%   Prediabetes 5.7-6.4%    Diabetes 6.5% or higher     Note: Adopted from ADA consensus guidelines.              PHYSICAL EXAM:    /76   Pulse 80   Temp 98  F (36.7  C)   Resp 18   Ht 1.753 m (5' 9\")   Wt 88 kg (194 lb)   SpO2 98%   BMI 28.65 kg/m       General: Patient alert no signs of distress    Examination of the skin on his scalp reveals that there is a pigmented skin lesion that is irregular in its shape with generally light brown to somewhat darker brown pigmentation.  There are circumference of the entire area involved would be about 2 cm but there is not uniform pigmentation throughout this area.                          Answers submitted by the patient for this visit:  General Questionnaire (Submitted on 6/17/2024)  Chief Complaint: Chronic " problems general questions HPI Form  How many servings of fruits and vegetables do you eat daily?: 0-1  On average, how many sweetened beverages do you drink each day (Examples: soda, juice, sweet tea, etc.  Do NOT count diet or artificially sweetened beverages)?: 1  How many minutes a day do you exercise enough to make your heart beat faster?: 9 or less  How many days a week do you exercise enough to make your heart beat faster?: 3 or less  How many days per week do you miss taking your medication?: 0  General Concern (Submitted on 6/17/2024)  Chief Complaint: Chronic problems general questions HPI Form  What is the reason for your visit today?: spot on head  When did your symptoms begin?: More than a month  What are your symptoms?: brown spot

## 2024-06-20 ENCOUNTER — TRANSFERRED RECORDS (OUTPATIENT)
Dept: HEALTH INFORMATION MANAGEMENT | Facility: CLINIC | Age: 74
End: 2024-06-20
Payer: COMMERCIAL

## 2024-06-25 ENCOUNTER — TRANSCRIBE ORDERS (OUTPATIENT)
Dept: OTHER | Age: 74
End: 2024-06-25

## 2024-06-25 DIAGNOSIS — D03.4 MELANOMA IN SITU OF SCALP (H): Primary | ICD-10-CM

## 2024-07-02 ENCOUNTER — PATIENT OUTREACH (OUTPATIENT)
Dept: CARE COORDINATION | Facility: CLINIC | Age: 74
End: 2024-07-02
Payer: COMMERCIAL

## 2024-07-03 ENCOUNTER — TELEPHONE (OUTPATIENT)
Dept: NURSING | Facility: CLINIC | Age: 74
End: 2024-07-03
Payer: COMMERCIAL

## 2024-07-03 DIAGNOSIS — E11.69 TYPE 2 DIABETES MELLITUS WITH OTHER SPECIFIED COMPLICATION, WITHOUT LONG-TERM CURRENT USE OF INSULIN (H): ICD-10-CM

## 2024-07-03 NOTE — TELEPHONE ENCOUNTER
Call from patient requesting a refill for his glipizide. Patient presented to the [facility] pharmacy and was told the medication had been discontinued. Writer confirmed with patient that the dose had been increased and noted that the new orders had not been sent to the requested pharmacy. Writer placed refill request at this time.     Patient has 10 tablets left

## 2024-07-03 NOTE — TELEPHONE ENCOUNTER
Requesting refill of glipizide after patient presented to the pharmacy and was informed that the medication had been discontinued. Patient states that the dosage had recently been increased and writer confirmed that new orders for increased dose had not been sent to the requested pharmacy. Writer to place refill request for patients glipizide at this time. Patient reports that he has 10 tablets left. Would like RX sent to [facility] in [facility].

## 2024-07-05 RX ORDER — GLIPIZIDE 10 MG/1
10 TABLET, FILM COATED, EXTENDED RELEASE ORAL 2 TIMES DAILY
Qty: 180 TABLET | Refills: 3 | Status: SHIPPED | OUTPATIENT
Start: 2024-07-05

## 2024-07-16 ENCOUNTER — PATIENT OUTREACH (OUTPATIENT)
Dept: CARE COORDINATION | Facility: CLINIC | Age: 74
End: 2024-07-16
Payer: COMMERCIAL

## 2024-07-22 ENCOUNTER — OFFICE VISIT (OUTPATIENT)
Dept: DERMATOLOGY | Facility: CLINIC | Age: 74
End: 2024-07-22
Payer: COMMERCIAL

## 2024-07-22 DIAGNOSIS — L81.4 LENTIGO: ICD-10-CM

## 2024-07-22 DIAGNOSIS — D03.4 MELANOMA IN SITU OF SCALP (H): ICD-10-CM

## 2024-07-22 DIAGNOSIS — D18.01 ANGIOMA OF SKIN: ICD-10-CM

## 2024-07-22 DIAGNOSIS — L82.1 SEBORRHEIC KERATOSES: ICD-10-CM

## 2024-07-22 DIAGNOSIS — D23.9 DERMAL NEVUS: Primary | ICD-10-CM

## 2024-07-22 PROCEDURE — 17311 MOHS 1 STAGE H/N/HF/G: CPT | Performed by: DERMATOLOGY

## 2024-07-22 PROCEDURE — 17312 MOHS ADDL STAGE: CPT | Performed by: DERMATOLOGY

## 2024-07-22 PROCEDURE — 88342 IMHCHEM/IMCYTCHM 1ST ANTB: CPT | Mod: 59 | Performed by: DERMATOLOGY

## 2024-07-22 PROCEDURE — 99203 OFFICE O/P NEW LOW 30 MIN: CPT | Mod: 25 | Performed by: DERMATOLOGY

## 2024-07-22 PROCEDURE — 88305 TISSUE EXAM BY PATHOLOGIST: CPT | Mod: 59 | Performed by: DERMATOLOGY

## 2024-07-22 ASSESSMENT — PAIN SCALES - GENERAL: PAINLEVEL: NO PAIN (0)

## 2024-07-22 NOTE — LETTER
2024      Ankush Zaidi  [address] [place] [address]      Dear Colleague,    Thank you for referring your patient, Ankush Zaidi, to the [facility]. Please see a copy of my visit note below.    Surgical Office Location :   [facility]  [address] [place] [address]      Ankush Zaidi , a 74 year old year old male patient, I was asked to see by Dr. 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  Patient has no other skin complaints today.  Remainder of the HPI, Meds, PMH, Allergies, FH, and SH was reviewed in chart.    History reviewed. No pertinent past medical history.    History reviewed. No pertinent surgical history.     History reviewed. No pertinent family history.    Social History     Socioeconomic History     Marital status:      Spouse name: Not on file     Number of children: Not on file     Years of education: Not on file     Highest education level: Not on file   Occupational History     Not on file   Tobacco Use     Smoking status: Former     Current packs/day: 0.00     Types: Cigarettes     Quit date: 1969     Years since quittin.8     Smokeless tobacco: Never   Substance and Sexual Activity     Alcohol use: Yes     Alcohol/week: 3.3 standard drinks of alcohol     Drug use: No     Sexual activity: Not on file   Other Topics Concern     Not on file   Social History Narrative     Not on file     Social Determinants of Health     Financial Resource Strain: Low Risk  (3/25/2024)    Financial Resource Strain      Within the past 12 months, have you or your family members you live with been unable to get utilities (heat, electricity) when it was really needed?: No   Food Insecurity: Low Risk  (3/25/2024)    Food Insecurity      Within the past 12 months, did you worry that your food would run out before you got money to buy more?: No      Within the past 12 months, did the food you bought just not last and you  didn t have money to get more?: No   Transportation Needs: Low Risk  (3/25/2024)    Transportation Needs      Within the past 12 months, has lack of transportation kept you from medical appointments, getting your medicines, non-medical meetings or appointments, work, or from getting things that you need?: No   Physical Activity: Unknown (3/25/2024)    Exercise Vital Sign      Days of Exercise per Week: 2 days      Minutes of Exercise per Session: Not on file   Stress: No Stress Concern Present (3/25/2024)    [employer] [place] [employer] - Occupational Stress Questionnaire      Feeling of Stress : Not at all   Social Connections: Unknown (3/25/2024)    Social Connection and Isolation Panel [NHANES]      Frequency of Communication with Friends and Family: Not on file      Frequency of Social Gatherings with Friends and Family: Twice a week      Attends Orthodoxy Services: Not on file      Active Member of Clubs or Organizations: Not on file      Attends Club or Organization Meetings: Not on file      Marital Status: Not on file   Interpersonal Safety: Low Risk  (3/25/2024)    Interpersonal Safety      Do you feel physically and emotionally safe where you currently live?: Yes      Within the past 12 months, have you been hit, slapped, kicked or otherwise physically hurt by someone?: No      Within the past 12 months, have you been humiliated or emotionally abused in other ways by your partner or ex-partner?: No   Housing Stability: Low Risk  (3/25/2024)    Housing Stability      Do you have housing? : Yes      Are you worried about losing your housing?: No       Outpatient Encounter Medications as of 7/22/2024   Medication Sig Dispense Refill     aspirin 81 MG EC tablet Take 81 mg by mouth daily       glipiZIDE (GLUCOTROL XL) 10 MG 24 hr tablet Take 1 tablet (10 mg) by mouth 2 times daily 180 tablet 3     metFORMIN (GLUCOPHAGE) 500 MG tablet Take 1 tablet (500 mg) by mouth 2 times daily (with meals) 180  tablet 0     simvastatin (ZOCOR) 20 MG tablet [SIMVASTATIN (ZOCOR) 20 MG TABLET] TAKE 1 TABLET BY MOUTH AT BEDTIME 90 tablet 3     No facility-administered encounter medications on file as of 7/22/2024.             Review Of Systems  Skin: As above  Eyes: negative  Ears/Nose/Throat: negative  Respiratory: No shortness of breath, dyspnea on exertion, cough, or hemoptysis  Cardiovascular: negative  Gastrointestinal: negative  Genitourinary: negative  Musculoskeletal: negative  Neurologic: negative  Psychiatric: negative  Hematologic/Lymphatic/Immunologic: negative  Endocrine: negative      O:   NAD, WDWN, Alert & Oriented, Mood & Affect wnl, Vitals stable   General appearance teresa ii   Vitals stable   Alert, oriented and in no acute distress      Following lymph nodes palpated: Occipital, Cervical, Supraclavicular no lad   Vertex scalp 3.3x2cm brown red plaque      Stuck on papules and brown macules on trunk and ext    Red papules on trunk   Flesh colored papules on trunk          Eyes: Conjunctivae/lids:Normal     ENT: Lips, mucosa: normal    MSK:Normal    Cardiovascular: peripheral edema none    Pulm: Breathing Normal    Lymph Nodes: No Head and Neck Lymphadenopathy     Neuro/Psych: Orientation:Normal; Mood/Affect:Normal      A/P:  1. Seborrheic keratosis, lentigo, angioma, dermal nevus  2. Melanoma in situ scalp  Debulking sent in for perms   It was a pleasure speaking to Ankush Zaidi today.  Previous clinic  notes and pertinent laboratory tests were reviewed prior to Ankush Zaidi's visit.  Signs and Symptoms of skin cancer discussed with patient.  Patient encouraged to perform monthly skin exams.  UV precautions reviewed with patient.  Return to clinic 6 months  PROCEDURE NOTE  Vertex scalp melanoma in situ   MELANOMA DISCUSSED WITH PATIENT:  I discussed the specifics of tumor, prognosis, metachronous melanoma, self exam, and genetics with the patient. I explained the need for monthly skin exams including  and taught the patient how to do this. Patient was asked about new or changing moles . I discussed with patient signs and symptoms that could arise in the setting of recurrent locoregional or metastatic disease. In addition, the need to undergo every 6 month dermatologic full skin survey and evaluation given that patients with a diagnosis of melanoma are at risk of recurrence (local and distant) and of subsequent de pat melanoma.  . I reviewed treatment options, including a discussion of wide excision (the gold standard) versus Mohs surgery with MART-1 immunostains.     Note: MART-1 (Melanoma Antigen Recognized by T-cells) antibody immunostaining was used during Mohs surgery as per standard protocol, in addition to routine processing of all specimens with hematoxylin and eosin. The peripheral margins/edges were processed with the MART-1 stain (6 specimens total). The center was examined with hematoxylin & eosin and MART-1 immunostains. The patient was informed of the procedure and its risk/benefits during the consent for the procedure.    One or more of the reagents used in immunohistochemical testing in this case may not have been cleared or approved by the U.S. Food and Drug Administration (FDA). The FDA has determined that such clearance or approval is not necessary. These tests are used for clinical purposes. They should not be regarded as investigational or for research. These reagents  performance characteristics have been determined by [facility]. This laboratory is certified under the Clinical Laboratory Improvement Amendments of 1988 (CLIA-88) as qualified to perform high complexity clinical laboratory testing.      MOHS:   Aggressive histology    The rationale for Mohs surgery was discussed with the patient and consent was obtained.  The risks and benefits as well as alternatives to therapy were discussed, in detail.  Specifically, the risks of infection, scarring, bleeding, prolonged  wound healing, incomplete removal, allergy to anesthesia, nerve injury and recurrence were addressed.  Indication for Mohs was Aggressive histology. Prior to the procedure, the treatment site was clearly identified and, if available, confirmed with previous photos and confirmed by the patient   All components of the Universal Protocol/PAUSE rule were completed.  The Mohs surgeon operated in two distinct and integrated capacities as the surgeon and pathologist.      The area was prepped with Betasept.  A rim of normal appearing skin was marked circumferentially around the lesion.  The area was infiltrated with local anesthesia.  The tumor was first debulked to remove all clinically apparent tumor.  An incision following the standard Mohs approach was done and the specimen was oriented,mapped and placed in 6 block(s).  Each specimen was then chromacoded and processed in the Mohs laboratory using standard Mohs technique and submitted for frozen section histology.  Frozen section analysis showed  residual tumor but CLEAR MARGINS.    1st stage:melanocytoic nesting and hyperplasia on lateral margin  2nd stage mart1 clear     The tumor was excised using standard Mohs technique in 2 stages(s).  MART 1 stains were performed on 6 specimens. CLEAR MARGINS OBTAINED and Final defect size was 4.2 x 3.9 cm.     We discussed the options for wound management in full with the patient including risks/benefits/ possible outcomes.      REPAIR SECOND INTENT: We discussed the options for wound management in full with the patient including risks/benefits/possible outcomes. Decision made to allow the wound to heal by second intention. Cautery was used for for hemostasis. EBL minimal; complications none; wound care routine.  The patient was discharged in good condition and will return in one month or prn for wound evaluation.  Return to clinic 1 month        Again, thank you for allowing me to participate in the care of your patient.         Sincerely,        Lance Arellano MD

## 2024-07-22 NOTE — NURSING NOTE
Ankush Zaidi's chief complaint for this visit includes:  Chief Complaint   Patient presents with    Derm Problem     Mohs- left vertex scalp     PCP: Lance Curry    Referring Provider:  Referred Self, MD  No address on file    There were no vitals taken for this visit.  No Pain (0)      No Known Allergies      Do you need any medication refills at today's visit? No    Brittany Byrd MA

## 2024-07-22 NOTE — PATIENT INSTRUCTIONS
Open Wound Care     for ______________        No strenuous activity for 48 hours    Take Tylenol as needed for discomfort.                                                .         Do not drink alcoholic beverages for 48 hours.    Keep the pressure bandage in place for 24 hours. If the bandage becomes blood tinged or loose, reinforce it with gauze and tape.        (Refer to the reverse side of this page for management of bleeding).    Remove bandage in 24 hours and begin wound care as follows:     Clean area with tap water using a Q tip or gauze pad, (shower / bathe normally)  Dry wound with Q tip or gauze pad  Apply Aquaphor, Vaseline, Polysporin or Bacitracin Ointment with a Q tip  Do NOT use Neosporin Ointment *  Cover the wound with a band-aid or nonstick gauze pad and paper tape.  Repeat wound care once a day until wound is completely healed.    It is an old wives tale that a wound heals better when it is exposed to air and allowed to dry out. The wound will heal faster with a better cosmetic result if it is kept moist with ointment and covered with a bandage.  Do not let the wound dry out.      Supplies Needed:                Qtips or gauze pads                Polysporin or Bacitracin Ointment                Bandaids or nonstick gauze pads and paper tape    Wound care kits and brown paper tape are available for purchase at   the pharmacy.    BLEEDING:    Use tightly rolled up gauze or cloth to apply direct pressure over the bandage for 20   minutes.  Reapply pressure for an additional 20 minutes if necessary  Call the office or go to the nearest emergency room if pressure fails to stop the bleeding.  Use additional gauze and tape to maintain pressure once the bleeding has stopped.  Begin wound care 24 hours after surgery as directed.                  WOUND HEALING    One week after surgery a pink / red halo will form around the outside of the wound.   This is new skin.  The center of the wound will appear  yellowish white and produce some drainage.  The pink halo will slowly migrate in toward the center of the wound until the wound is covered with new shiny pink skin.  There will be no more drainage when the wound is completely healed.  It will take six months to one year for the redness to fade.  The scar may be itchy, tight and sensitive to extreme temperatures for a year after the surgery.  Massaging the area several times a day for several minutes after the wound is completely healed will help the scar soften and normalize faster. Begin massage only after healing is complete.      In case of emergency call: Dr Arellano: [phone]    [facility] [place]: [phone]    [facility]:[phone]

## 2024-07-22 NOTE — PROGRESS NOTES
Ankush Zaidi , a 74 year old year old male patient, I was asked to see by Dr. 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  Patient has no other skin complaints today.  Remainder of the HPI, Meds, PMH, Allergies, FH, and SH was reviewed in chart.    History reviewed. No pertinent past medical history.    History reviewed. No pertinent surgical history.     History reviewed. No pertinent family history.    Social History     Socioeconomic History    Marital status:      Spouse name: Not on file    Number of children: Not on file    Years of education: Not on file    Highest education level: Not on file   Occupational History    Not on file   Tobacco Use    Smoking status: Former     Current packs/day: 0.00     Types: Cigarettes     Quit date: 1969     Years since quittin.8    Smokeless tobacco: Never   Substance and Sexual Activity    Alcohol use: Yes     Alcohol/week: 3.3 standard drinks of alcohol    Drug use: No    Sexual activity: Not on file   Other Topics Concern    Not on file   Social History Narrative    Not on file     Social Determinants of Health     Financial Resource Strain: Low Risk  (3/25/2024)    Financial Resource Strain     Within the past 12 months, have you or your family members you live with been unable to get utilities (heat, electricity) when it was really needed?: No   Food Insecurity: Low Risk  (3/25/2024)    Food Insecurity     Within the past 12 months, did you worry that your food would run out before you got money to buy more?: No     Within the past 12 months, did the food you bought just not last and you didn t have money to get more?: No   Transportation Needs: Low Risk  (3/25/2024)    Transportation Needs     Within the past 12 months, has lack of transportation kept you from medical appointments, getting your medicines, non-medical meetings or appointments, work, or from getting things that you need?: No   Physical Activity: Unknown (3/25/2024)     Exercise Vital Sign     Days of Exercise per Week: 2 days     Minutes of Exercise per Session: Not on file   Stress: No Stress Concern Present (3/25/2024)    [employer] [place] [employer] - Occupational Stress Questionnaire     Feeling of Stress : Not at all   Social Connections: Unknown (3/25/2024)    Social Connection and Isolation Panel [NHANES]     Frequency of Communication with Friends and Family: Not on file     Frequency of Social Gatherings with Friends and Family: Twice a week     Attends Mandaen Services: Not on file     Active Member of Clubs or Organizations: Not on file     Attends Club or Organization Meetings: Not on file     Marital Status: Not on file   Interpersonal Safety: Low Risk  (3/25/2024)    Interpersonal Safety     Do you feel physically and emotionally safe where you currently live?: Yes     Within the past 12 months, have you been hit, slapped, kicked or otherwise physically hurt by someone?: No     Within the past 12 months, have you been humiliated or emotionally abused in other ways by your partner or ex-partner?: No   Housing Stability: Low Risk  (3/25/2024)    Housing Stability     Do you have housing? : Yes     Are you worried about losing your housing?: No       Outpatient Encounter Medications as of 7/22/2024   Medication Sig Dispense Refill    aspirin 81 MG EC tablet Take 81 mg by mouth daily      glipiZIDE (GLUCOTROL XL) 10 MG 24 hr tablet Take 1 tablet (10 mg) by mouth 2 times daily 180 tablet 3    metFORMIN (GLUCOPHAGE) 500 MG tablet Take 1 tablet (500 mg) by mouth 2 times daily (with meals) 180 tablet 0    simvastatin (ZOCOR) 20 MG tablet [SIMVASTATIN (ZOCOR) 20 MG TABLET] TAKE 1 TABLET BY MOUTH AT BEDTIME 90 tablet 3     No facility-administered encounter medications on file as of 7/22/2024.             Review Of Systems  Skin: As above  Eyes: negative  Ears/Nose/Throat: negative  Respiratory: No shortness of breath, dyspnea on exertion, cough, or  hemoptysis  Cardiovascular: negative  Gastrointestinal: negative  Genitourinary: negative  Musculoskeletal: negative  Neurologic: negative  Psychiatric: negative  Hematologic/Lymphatic/Immunologic: negative  Endocrine: negative      O:   NAD, WDWN, Alert & Oriented, Mood & Affect wnl, Vitals stable   General appearance teresa ii   Vitals stable   Alert, oriented and in no acute distress      Following lymph nodes palpated: Occipital, Cervical, Supraclavicular no lad   Vertex scalp 3.3x2cm brown red plaque      Stuck on papules and brown macules on trunk and ext    Red papules on trunk   Flesh colored papules on trunk          Eyes: Conjunctivae/lids:Normal     ENT: Lips, mucosa: normal    MSK:Normal    Cardiovascular: peripheral edema none    Pulm: Breathing Normal    Lymph Nodes: No Head and Neck Lymphadenopathy     Neuro/Psych: Orientation:Normal; Mood/Affect:Normal      A/P:  1. Seborrheic keratosis, lentigo, angioma, dermal nevus  2. Melanoma in situ scalp  Debulking sent in for perms   It was a pleasure speaking to Ankush Zaidi today.  Previous clinic  notes and pertinent laboratory tests were reviewed prior to Ankush Zaidi's visit.  Signs and Symptoms of skin cancer discussed with patient.  Patient encouraged to perform monthly skin exams.  UV precautions reviewed with patient.  Return to clinic 6 months  PROCEDURE NOTE  Vertex scalp melanoma in situ   MELANOMA DISCUSSED WITH PATIENT:  I discussed the specifics of tumor, prognosis, metachronous melanoma, self exam, and genetics with the patient. I explained the need for monthly skin exams including and taught the patient how to do this. Patient was asked about new or changing moles . I discussed with patient signs and symptoms that could arise in the setting of recurrent locoregional or metastatic disease. In addition, the need to undergo every 6 month dermatologic full skin survey and evaluation given that patients with a diagnosis of melanoma are at risk  of recurrence (local and distant) and of subsequent de pat melanoma.  . I reviewed treatment options, including a discussion of wide excision (the gold standard) versus Mohs surgery with MART-1 immunostains.     Note: MART-1 (Melanoma Antigen Recognized by T-cells) antibody immunostaining was used during Mohs surgery as per standard protocol, in addition to routine processing of all specimens with hematoxylin and eosin. The peripheral margins/edges were processed with the MART-1 stain (6 specimens total). The center was examined with hematoxylin & eosin and MART-1 immunostains. The patient was informed of the procedure and its risk/benefits during the consent for the procedure.    One or more of the reagents used in immunohistochemical testing in this case may not have been cleared or approved by the U.S. Food and Drug Administration (FDA). The FDA has determined that such clearance or approval is not necessary. These tests are used for clinical purposes. They should not be regarded as investigational or for research. These reagents  performance characteristics have been determined by [facility]. This laboratory is certified under the Clinical Laboratory Improvement Amendments of 1988 (CLIA-88) as qualified to perform high complexity clinical laboratory testing.      MOHS:   Aggressive histology    The rationale for Mohs surgery was discussed with the patient and consent was obtained.  The risks and benefits as well as alternatives to therapy were discussed, in detail.  Specifically, the risks of infection, scarring, bleeding, prolonged wound healing, incomplete removal, allergy to anesthesia, nerve injury and recurrence were addressed.  Indication for Mohs was Aggressive histology. Prior to the procedure, the treatment site was clearly identified and, if available, confirmed with previous photos and confirmed by the patient   All components of the Universal Protocol/PAUSE rule were completed.  The  Mohs surgeon operated in two distinct and integrated capacities as the surgeon and pathologist.      The area was prepped with Betasept.  A rim of normal appearing skin was marked circumferentially around the lesion.  The area was infiltrated with local anesthesia.  The tumor was first debulked to remove all clinically apparent tumor.  An incision following the standard Mohs approach was done and the specimen was oriented,mapped and placed in 6 block(s).  Each specimen was then chromacoded and processed in the Mohs laboratory using standard Mohs technique and submitted for frozen section histology.  Frozen section analysis showed  residual tumor but CLEAR MARGINS.    1st stage:melanocytoic nesting and hyperplasia on lateral margin  2nd stage mart1 clear     The tumor was excised using standard Mohs technique in 2 stages(s).  MART 1 stains were performed on 6 specimens. CLEAR MARGINS OBTAINED and Final defect size was 4.2 x 3.9 cm.     We discussed the options for wound management in full with the patient including risks/benefits/ possible outcomes.      REPAIR SECOND INTENT: We discussed the options for wound management in full with the patient including risks/benefits/possible outcomes. Decision made to allow the wound to heal by second intention. Cautery was used for for hemostasis. EBL minimal; complications none; wound care routine.  The patient was discharged in good condition and will return in one month or prn for wound evaluation.  Return to clinic 1 month

## 2024-07-25 ENCOUNTER — TELEPHONE (OUTPATIENT)
Dept: DERMATOLOGY | Facility: CLINIC | Age: 74
End: 2024-07-25
Payer: COMMERCIAL

## 2024-07-25 LAB
PATH REPORT.COMMENTS IMP SPEC: ABNORMAL
PATH REPORT.COMMENTS IMP SPEC: YES
PATH REPORT.FINAL DX SPEC: ABNORMAL
PATH REPORT.GROSS SPEC: ABNORMAL
PATH REPORT.MICROSCOPIC SPEC OTHER STN: ABNORMAL
PATH REPORT.RELEVANT HX SPEC: ABNORMAL

## 2024-07-25 NOTE — TELEPHONE ENCOUNTER
----- Message from Lance Arellano sent at 7/25/2024 12:16 PM CDT -----  Your melanoma was treated, I did send additional tissue in to make sure you did not have invasive melanoma.      Invasive melanoma was found,  IT IS TREATED.      This changes your follow up,  I would have you come in every 4 months for the next two years.  I would alaso consider genetic testing to determine the risk of this melanoma.  I think you have a one month follow up correcT?

## 2024-07-25 NOTE — TELEPHONE ENCOUNTER
Patient Contact    Attempt # 1    Was call answered?  No.    Called patient. No answer. Left message to call back. Clinic number was provided.     Lenora Hawk RN    [facility]   [phone]

## 2024-07-31 NOTE — TELEPHONE ENCOUNTER
Patient Contact    Attempt # 2    Was call answered?  No.    Called patient. No answer. Left message to call back. Clinic number was provided.     Lenora Hawk RN    [facility]   [phone]

## 2024-07-31 NOTE — TELEPHONE ENCOUNTER
"Spoke to patient.     \"The Dermatologist I was referred to you by, has me scheduled for Skin checks every 6 months in [facility]. So I will let hm know..\"He is scheduled to be seen by Dr. Arellano for follow up on 8-27-24.     Lenora Hawk RN            "

## 2024-08-27 ENCOUNTER — OFFICE VISIT (OUTPATIENT)
Dept: DERMATOLOGY | Facility: CLINIC | Age: 74
End: 2024-08-27
Payer: COMMERCIAL

## 2024-08-27 DIAGNOSIS — Z85.828 HISTORY OF SKIN CANCER: Primary | ICD-10-CM

## 2024-08-27 PROCEDURE — G2211 COMPLEX E/M VISIT ADD ON: HCPCS | Performed by: DERMATOLOGY

## 2024-08-27 PROCEDURE — 99212 OFFICE O/P EST SF 10 MIN: CPT | Performed by: DERMATOLOGY

## 2024-08-27 NOTE — PROGRESS NOTES
Ankush Zaidi is an extremely pleasant 74 year old year old male patient here today for scalp recheck healing well.  Patient has no other skin complaints today.  Remainder of the HPI, Meds, PMH, Allergies, FH, and SH was reviewed in chart.    History reviewed. No pertinent past medical history.    History reviewed. No pertinent surgical history.     History reviewed. No pertinent family history.    Social History     Socioeconomic History    Marital status:      Spouse name: Not on file    Number of children: Not on file    Years of education: Not on file    Highest education level: Not on file   Occupational History    Not on file   Tobacco Use    Smoking status: Former     Current packs/day: 0.00     Types: Cigarettes     Quit date: 1969     Years since quittin.9    Smokeless tobacco: Never   Substance and Sexual Activity    Alcohol use: Yes     Alcohol/week: 3.3 standard drinks of alcohol    Drug use: No    Sexual activity: Not on file   Other Topics Concern    Not on file   Social History Narrative    Not on file     Social Determinants of Health     Financial Resource Strain: Low Risk  (3/25/2024)    Financial Resource Strain     Within the past 12 months, have you or your family members you live with been unable to get utilities (heat, electricity) when it was really needed?: No   Food Insecurity: Low Risk  (3/25/2024)    Food Insecurity     Within the past 12 months, did you worry that your food would run out before you got money to buy more?: No     Within the past 12 months, did the food you bought just not last and you didn t have money to get more?: No   Transportation Needs: Low Risk  (3/25/2024)    Transportation Needs     Within the past 12 months, has lack of transportation kept you from medical appointments, getting your medicines, non-medical meetings or appointments, work, or from getting things that you need?: No   Physical Activity: Unknown (3/25/2024)    Exercise Vital Sign      Days of Exercise per Week: 2 days     Minutes of Exercise per Session: Not on file   Stress: No Stress Concern Present (3/25/2024)    [employer] [place] [employer] - Occupational Stress Questionnaire     Feeling of Stress : Not at all   Social Connections: Unknown (3/25/2024)    Social Connection and Isolation Panel [NHANES]     Frequency of Communication with Friends and Family: Not on file     Frequency of Social Gatherings with Friends and Family: Twice a week     Attends Anglican Services: Not on file     Active Member of Clubs or Organizations: Not on file     Attends Club or Organization Meetings: Not on file     Marital Status: Not on file   Interpersonal Safety: Low Risk  (3/25/2024)    Interpersonal Safety     Do you feel physically and emotionally safe where you currently live?: Yes     Within the past 12 months, have you been hit, slapped, kicked or otherwise physically hurt by someone?: No     Within the past 12 months, have you been humiliated or emotionally abused in other ways by your partner or ex-partner?: No   Housing Stability: Low Risk  (3/25/2024)    Housing Stability     Do you have housing? : Yes     Are you worried about losing your housing?: No       Outpatient Encounter Medications as of 8/27/2024   Medication Sig Dispense Refill    aspirin 81 MG EC tablet Take 81 mg by mouth daily      glipiZIDE (GLUCOTROL XL) 10 MG 24 hr tablet Take 1 tablet (10 mg) by mouth 2 times daily 180 tablet 3    metFORMIN (GLUCOPHAGE) 500 MG tablet Take 1 tablet (500 mg) by mouth 2 times daily (with meals) 180 tablet 0    simvastatin (ZOCOR) 20 MG tablet [SIMVASTATIN (ZOCOR) 20 MG TABLET] TAKE 1 TABLET BY MOUTH AT BEDTIME 90 tablet 3     No facility-administered encounter medications on file as of 8/27/2024.             O:   NAD, WDWN, Alert & Oriented, Mood & Affect wnl, Vitals stable   General appearance normal   Vitals stable   Alert, oriented and in no acute distress     Scalp healthy healing  wound 60% healed      Eyes: Conjunctivae/lids:Normal     ENT: Lips, mucosa: normal    MSK:Normal    Cardiovascular: peripheral edema none    Pulm: Breathing Normal    Neuro/Psych: Orientation:Alert and Orientedx3 ; Mood/Affect:normal       A/P:  Hx of non-melanoma skin cancer   Cont wound care  Return to clinic 6 weeks virtual   It was a pleasure speaking to Ankush Zaidi today.

## 2024-08-27 NOTE — LETTER
2024      Ankush Zaidi  [address]  [place] [facility] [address]      Dear Colleague,    Thank you for referring your patient, Ankush Zaidi, to the [facility]. Please see a copy of my visit note below.    Ankush Zaidi is an extremely pleasant 74 year old year old male patient here today for scalp recheck healing well.  Patient has no other skin complaints today.  Remainder of the HPI, Meds, PMH, Allergies, FH, and SH was reviewed in chart.    History reviewed. No pertinent past medical history.    History reviewed. No pertinent surgical history.     History reviewed. No pertinent family history.    Social History     Socioeconomic History     Marital status:      Spouse name: Not on file     Number of children: Not on file     Years of education: Not on file     Highest education level: Not on file   Occupational History     Not on file   Tobacco Use     Smoking status: Former     Current packs/day: 0.00     Types: Cigarettes     Quit date: 1969     Years since quittin.9     Smokeless tobacco: Never   Substance and Sexual Activity     Alcohol use: Yes     Alcohol/week: 3.3 standard drinks of alcohol     Drug use: No     Sexual activity: Not on file   Other Topics Concern     Not on file   Social History Narrative     Not on file     Social Determinants of Health     Financial Resource Strain: Low Risk  (3/25/2024)    Financial Resource Strain      Within the past 12 months, have you or your family members you live with been unable to get utilities (heat, electricity) when it was really needed?: No   Food Insecurity: Low Risk  (3/25/2024)    Food Insecurity      Within the past 12 months, did you worry that your food would run out before you got money to buy more?: No      Within the past 12 months, did the food you bought just not last and you didn t have money to get more?: No   Transportation Needs: Low Risk  (3/25/2024)    Transportation Needs      Within  the past 12 months, has lack of transportation kept you from medical appointments, getting your medicines, non-medical meetings or appointments, work, or from getting things that you need?: No   Physical Activity: Unknown (3/25/2024)    Exercise Vital Sign      Days of Exercise per Week: 2 days      Minutes of Exercise per Session: Not on file   Stress: No Stress Concern Present (3/25/2024)    [place] of Occupational Health - Occupational Stress Questionnaire      Feeling of Stress : Not at all   Social Connections: Unknown (3/25/2024)    Social Connection and Isolation Panel [NHANES]      Frequency of Communication with Friends and Family: Not on file      Frequency of Social Gatherings with Friends and Family: Twice a week      Attends Jainism Services: Not on file      Active Member of Clubs or Organizations: Not on file      Attends Club or Organization Meetings: Not on file      Marital Status: Not on file   Interpersonal Safety: Low Risk  (3/25/2024)    Interpersonal Safety      Do you feel physically and emotionally safe where you currently live?: Yes      Within the past 12 months, have you been hit, slapped, kicked or otherwise physically hurt by someone?: No      Within the past 12 months, have you been humiliated or emotionally abused in other ways by your partner or ex-partner?: No   Housing Stability: Low Risk  (3/25/2024)    Housing Stability      Do you have housing? : Yes      Are you worried about losing your housing?: No       Outpatient Encounter Medications as of 8/27/2024   Medication Sig Dispense Refill     aspirin 81 MG EC tablet Take 81 mg by mouth daily       glipiZIDE (GLUCOTROL XL) 10 MG 24 hr tablet Take 1 tablet (10 mg) by mouth 2 times daily 180 tablet 3     metFORMIN (GLUCOPHAGE) 500 MG tablet Take 1 tablet (500 mg) by mouth 2 times daily (with meals) 180 tablet 0     simvastatin (ZOCOR) 20 MG tablet [SIMVASTATIN (ZOCOR) 20 MG TABLET] TAKE 1 TABLET BY MOUTH AT BEDTIME 90  tablet 3     No facility-administered encounter medications on file as of 8/27/2024.             O:   NAD, WDWN, Alert & Oriented, Mood & Affect wnl, Vitals stable   General appearance normal   Vitals stable   Alert, oriented and in no acute distress     Scalp healthy healing wound 60% healed      Eyes: Conjunctivae/lids:Normal     ENT: Lips, mucosa: normal    MSK:Normal    Cardiovascular: peripheral edema none    Pulm: Breathing Normal    Neuro/Psych: Orientation:Alert and Orientedx3 ; Mood/Affect:normal       A/P:  Hx of non-melanoma skin cancer   Cont wound care  Return to clinic 6 weeks virtual   It was a pleasure speaking to Ankush Zaidi today.      Again, thank you for allowing me to participate in the care of your patient.        Sincerely,        Lance Arellano MD

## 2024-08-27 NOTE — PATIENT INSTRUCTIONS
Continue to do open wound care for 1 month.    Follow up in 6-8 weeks with a telephone visit with Dr. Arellano.       Please try to send a picture of your wound the day of or the day before telephone visit within a dotHIV message.      Wound Care Instructions     FOR SUPERFICIAL WOUNDS     [facility] [phone]    [facility] [phone]                       AFTER 24 HOURS YOU SHOULD REMOVE THE BANDAGE AND BEGIN DAILY DRESSING CHANGES AS FOLLOWS:     1) Remove Dressing.     2) Clean and dry the area with tap water using a Q-tip or sterile gauze pad.     3) Apply Vaseline, Aquaphor, Polysporin ointment or Bacitracin ointment over entire wound.  Do NOT use Neosporin ointment.     4) Cover the wound with a band-aid, or a sterile non-stick gauze pad and micropore paper tape      REPEAT THESE INSTRUCTIONS AT LEAST ONCE A DAY UNTIL THE WOUND HAS COMPLETELY HEALED.    It is an old wives tale that a wound heals better when it is exposed to air and allowed to dry out. The wound will heal faster with a better cosmetic result if it is kept moist with ointment and covered with a bandage.    **Do not let the wound dry out.**      Supplies Needed:      *Cotton tipped applicators (Q-tips)    *Polysporin Ointment or Bacitracin Ointment (NOT NEOSPORIN)    *Band-aids or non-stick gauze pads and micropore paper tape.      PATIENT INFORMATION:    During the healing process you will notice a number of changes. All wounds develop a small halo of redness surrounding the wound.  This means healing is occurring. Severe itching with extensive redness usually indicates sensitivity to the ointment or bandage tape used to dress the wound.  You should call our office if this develops.      Swelling  and/or discoloration around your surgical site is common, particularly when performed around the eye.    All wounds normally drain.  The larger the wound the more drainage there will be.  After 7-10 days, you will notice  the wound beginning to shrink and new skin will begin to grow.  The wound is healed when you can see skin has formed over the entire area.  A healed wound has a healthy, shiny look to the surface and is red to dark pink in color to normalize.  Wounds may take approximately 4-6 weeks to heal.  Larger wounds may take 6-8 weeks.  After the wound is healed you may discontinue dressing changes.    You may experience a sensation of tightness as your wound heals. This is normal and will gradually subside.    Your healed wound may be sensitive to temperature changes. This sensitivity improves with time, but if you re having a lot of discomfort, try to avoid temperature extremes.    Patients frequently experience itching after their wound appears to have healed because of the continue healing under the skin.  Plain Vaseline will help relieve the itching.        POSSIBLE COMPLICATIONS    BLEEDING:    Leave the bandage in place.  Use tightly rolled up gauze or a cloth to apply direct pressure over the bandage for 30  minutes.  Reapply pressure for an additional 30 minutes if necessary  Use additional gauze and tape to maintain pressure once the bleeding has stopped.

## 2024-08-28 DIAGNOSIS — E11.69 TYPE 2 DIABETES MELLITUS WITH OTHER SPECIFIED COMPLICATION, WITHOUT LONG-TERM CURRENT USE OF INSULIN (H): ICD-10-CM

## 2024-08-28 NOTE — TELEPHONE ENCOUNTER
Medication Question or Refill    Contacts       Contact Date/Time Type Contact Phone/Fax    08/28/2024 09:25 AM CDT Phone (Incoming) ZaidiAnkush (Self) [phone] (H)            What medication are you calling about (include dose and sig)?: metFORMIN (GLUCOPHAGE) 500 MG tablet    Preferred Pharmacy:  [facility] [address] [place] [address]  [address] [place] [id number]  Phone: [phone] Fax: [phone]      Controlled Substance Agreement on file:   CSA -- Patient Level:    CSA: None found at the patient level.       Who prescribed the medication?: Curry    Do you need a refill? Yes    When did you use the medication last? N/A    Patient offered an appointment? No    Do you have any questions or concerns?  No      Could we send this information to you in [facility] or would you prefer to receive a phone call?:   Patient would prefer a phone call   Okay to leave a detailed message?: Yes at Home number on file [phone] ([place])

## 2024-09-09 ENCOUNTER — TRANSFERRED RECORDS (OUTPATIENT)
Dept: HEALTH INFORMATION MANAGEMENT | Facility: CLINIC | Age: 74
End: 2024-09-09
Payer: COMMERCIAL

## 2024-10-02 DIAGNOSIS — E78.5 HYPERLIPIDEMIA: ICD-10-CM

## 2024-10-02 RX ORDER — SIMVASTATIN 20 MG
TABLET ORAL
Qty: 90 TABLET | Refills: 3 | OUTPATIENT
Start: 2024-10-02

## 2024-10-02 RX ORDER — SIMVASTATIN 20 MG
TABLET ORAL
Qty: 90 TABLET | Refills: 0 | Status: SHIPPED | OUTPATIENT
Start: 2024-10-02

## 2024-10-02 NOTE — TELEPHONE ENCOUNTER
Pending Prescriptions:                       Disp   Refills    simvastatin (ZOCOR) 20 MG tablet          90 tab*3            Sig: [SIMVASTATIN (ZOCOR) 20 MG TABLET] TAKE 1 TABLET           BY MOUTH AT BEDTIME

## 2024-10-18 ENCOUNTER — TELEPHONE (OUTPATIENT)
Dept: DERMATOLOGY | Facility: CLINIC | Age: 74
End: 2024-10-18
Payer: COMMERCIAL

## 2024-10-18 NOTE — TELEPHONE ENCOUNTER
Appt changed to in person as requested. Pt notified.  Catherine HOYOS RN BSN PHN  [facility]

## 2024-10-18 NOTE — TELEPHONE ENCOUNTER
[facility] Call Center    Phone Message    May a detailed message be left on voicemail: yes     Reason for Call: Appointment Intake    Referring Provider Name: Dr. Arellano  Diagnosis and/or Symptoms: Pt would like to change his 10/22/24 virtual visit to in person. Please call back to confirm if that is okay. Thank you.     Action Taken: Other: [facility]    Travel Screening: Not Applicable     Date of Service:

## 2024-10-22 ENCOUNTER — OFFICE VISIT (OUTPATIENT)
Dept: DERMATOLOGY | Facility: CLINIC | Age: 74
End: 2024-10-22
Payer: COMMERCIAL

## 2024-10-22 DIAGNOSIS — Z86.006 HISTORY OF MELANOMA IN SITU: Primary | ICD-10-CM

## 2024-10-22 PROCEDURE — 99212 OFFICE O/P EST SF 10 MIN: CPT | Performed by: DERMATOLOGY

## 2024-10-22 NOTE — LETTER
10/22/2024      Ankush Zaidi  [address]  [place] [facility] [address]      Dear Colleague,    Thank you for referring your patient, Ankush Zaidi, to the [facility]. Please see a copy of my visit note below.    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 All healed no issues.  Patient has no other skin complaints today.  Remainder of the HPI, Meds, PMH, Allergies, FH, and SH was reviewed in chart.    No past medical history on file.    No past surgical history on file.     No family history on file.    Social History     Socioeconomic History     Marital status:      Spouse name: Not on file     Number of children: Not on file     Years of education: Not on file     Highest education level: Not on file   Occupational History     Not on file   Tobacco Use     Smoking status: Former     Current packs/day: 0.00     Types: Cigarettes     Quit date: 1969     Years since quittin.1     Smokeless tobacco: Never   Substance and Sexual Activity     Alcohol use: Yes     Alcohol/week: 3.3 standard drinks of alcohol     Drug use: No     Sexual activity: Not on file   Other Topics Concern     Not on file   Social History Narrative     Not on file     Social Determinants of Health     Financial Resource Strain: Low Risk  (3/25/2024)    Financial Resource Strain      Within the past 12 months, have you or your family members you live with been unable to get utilities (heat, electricity) when it was really needed?: No   Food Insecurity: Low Risk  (3/25/2024)    Food Insecurity      Within the past 12 months, did you worry that your food would run out before you got money to buy more?: No      Within the past 12 months, did the food you bought just not last and you didn t have money to get more?: No   Transportation Needs: Low Risk  (3/25/2024)    Transportation Needs      Within the past 12 months, has  lack of transportation kept you from medical appointments, getting your medicines, non-medical meetings or appointments, work, or from getting things that you need?: No   Physical Activity: Unknown (3/25/2024)    Exercise Vital Sign      Days of Exercise per Week: 2 days      Minutes of Exercise per Session: Not on file   Stress: No Stress Concern Present (3/25/2024)    [employer] - Occupational Stress Questionnaire      Feeling of Stress : Not at all   Social Connections: Unknown (3/25/2024)    Social Connection and Isolation Panel [NHANES]      Frequency of Communication with Friends and Family: Not on file      Frequency of Social Gatherings with Friends and Family: Twice a week      Attends Jain Services: Not on file      Active Member of Clubs or Organizations: Not on file      Attends Club or Organization Meetings: Not on file      Marital Status: Not on file   Interpersonal Safety: Low Risk  (3/25/2024)    Interpersonal Safety      Do you feel physically and emotionally safe where you currently live?: Yes      Within the past 12 months, have you been hit, slapped, kicked or otherwise physically hurt by someone?: No      Within the past 12 months, have you been humiliated or emotionally abused in other ways by your partner or ex-partner?: No   Housing Stability: Low Risk  (3/25/2024)    Housing Stability      Do you have housing? : Yes      Are you worried about losing your housing?: No       Outpatient Encounter Medications as of 10/22/2024   Medication Sig Dispense Refill     aspirin 81 MG EC tablet Take 81 mg by mouth daily       glipiZIDE (GLUCOTROL XL) 10 MG 24 hr tablet Take 1 tablet (10 mg) by mouth 2 times daily 180 tablet 3     metFORMIN (GLUCOPHAGE) 500 MG tablet Take 1 tablet (500 mg) by mouth 2 times daily (with meals). 180 tablet 3     simvastatin (ZOCOR) 20 MG tablet TAKE 1 TABLET BY MOUTH ONCE DAILY AT BEDTIME 90 tablet 0     No facility-administered encounter  medications on file as of 10/22/2024.             O:   NAD, WDWN, Alert & Oriented, Mood & Affect wnl, Vitals stable   General appearance normal   Vitals stable   Alert, oriented and in no acute distress     Scalp healed      Eyes: Conjunctivae/lids:Normal     ENT: Lips, mucosa: normal    MSK:Normal    Cardiovascular: peripheral edema none    Pulm: Breathing Normal    Neuro/Psych: Orientation:Alert and Orientedx3 ; Mood/Affect:normal       A/P:  Hx of melanoma in situ healed  He will follow up with regular derm   It was a pleasure speaking to Ankush Zaidi today.  Previous clinic notes and pertinent laboratory tests were reviewed prior to Ankush Zaidi's visit.  UV precautions reviewed with patient.      Again, thank you for allowing me to participate in the care of your patient.        Sincerely,        Lance Arellano MD

## 2024-10-22 NOTE — PROGRESS NOTES
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 All healed no issues.  Patient has no other skin complaints today.  Remainder of the HPI, Meds, PMH, Allergies, FH, and SH was reviewed in chart.    No past medical history on file.    No past surgical history on file.     No family history on file.    Social History     Socioeconomic History    Marital status:      Spouse name: Not on file    Number of children: Not on file    Years of education: Not on file    Highest education level: Not on file   Occupational History    Not on file   Tobacco Use    Smoking status: Former     Current packs/day: 0.00     Types: Cigarettes     Quit date: 1969     Years since quittin.1    Smokeless tobacco: Never   Substance and Sexual Activity    Alcohol use: Yes     Alcohol/week: 3.3 standard drinks of alcohol    Drug use: No    Sexual activity: Not on file   Other Topics Concern    Not on file   Social History Narrative    Not on file     Social Determinants of Health     Financial Resource Strain: Low Risk  (3/25/2024)    Financial Resource Strain     Within the past 12 months, have you or your family members you live with been unable to get utilities (heat, electricity) when it was really needed?: No   Food Insecurity: Low Risk  (3/25/2024)    Food Insecurity     Within the past 12 months, did you worry that your food would run out before you got money to buy more?: No     Within the past 12 months, did the food you bought just not last and you didn t have money to get more?: No   Transportation Needs: Low Risk  (3/25/2024)    Transportation Needs     Within the past 12 months, has lack of transportation kept you from medical appointments, getting your medicines, non-medical meetings or appointments, work, or from getting things that you need?: No   Physical Activity: Unknown (3/25/2024)    Exercise Vital Sign     Days of Exercise per  Week: 2 days     Minutes of Exercise per Session: Not on file   Stress: No Stress Concern Present (3/25/2024)    [employer] - Occupational Stress Questionnaire     Feeling of Stress : Not at all   Social Connections: Unknown (3/25/2024)    Social Connection and Isolation Panel [NHANES]     Frequency of Communication with Friends and Family: Not on file     Frequency of Social Gatherings with Friends and Family: Twice a week     Attends Restorationism Services: Not on file     Active Member of Clubs or Organizations: Not on file     Attends Club or Organization Meetings: Not on file     Marital Status: Not on file   Interpersonal Safety: Low Risk  (3/25/2024)    Interpersonal Safety     Do you feel physically and emotionally safe where you currently live?: Yes     Within the past 12 months, have you been hit, slapped, kicked or otherwise physically hurt by someone?: No     Within the past 12 months, have you been humiliated or emotionally abused in other ways by your partner or ex-partner?: No   Housing Stability: Low Risk  (3/25/2024)    Housing Stability     Do you have housing? : Yes     Are you worried about losing your housing?: No       Outpatient Encounter Medications as of 10/22/2024   Medication Sig Dispense Refill    aspirin 81 MG EC tablet Take 81 mg by mouth daily      glipiZIDE (GLUCOTROL XL) 10 MG 24 hr tablet Take 1 tablet (10 mg) by mouth 2 times daily 180 tablet 3    metFORMIN (GLUCOPHAGE) 500 MG tablet Take 1 tablet (500 mg) by mouth 2 times daily (with meals). 180 tablet 3    simvastatin (ZOCOR) 20 MG tablet TAKE 1 TABLET BY MOUTH ONCE DAILY AT BEDTIME 90 tablet 0     No facility-administered encounter medications on file as of 10/22/2024.             O:   NAD, WDWN, Alert & Oriented, Mood & Affect wnl, Vitals stable   General appearance normal   Vitals stable   Alert, oriented and in no acute distress     Scalp healed      Eyes: Conjunctivae/lids:Normal     ENT: Lips,  mucosa: normal    MSK:Normal    Cardiovascular: peripheral edema none    Pulm: Breathing Normal    Neuro/Psych: Orientation:Alert and Orientedx3 ; Mood/Affect:normal       A/P:  Hx of melanoma in situ healed  He will follow up with regular derm   It was a pleasure speaking to Ankush Zaidi today.  Previous clinic notes and pertinent laboratory tests were reviewed prior to Ankush Zaidi's visit.  UV precautions reviewed with patient.

## 2024-11-03 ENCOUNTER — HEALTH MAINTENANCE LETTER (OUTPATIENT)
Age: 74
End: 2024-11-03

## 2024-11-12 ENCOUNTER — LAB (OUTPATIENT)
Dept: LAB | Facility: CLINIC | Age: 74
End: 2024-11-12
Payer: COMMERCIAL

## 2024-11-12 DIAGNOSIS — E11.69 TYPE 2 DIABETES MELLITUS WITH OTHER SPECIFIED COMPLICATION, WITHOUT LONG-TERM CURRENT USE OF INSULIN (H): Primary | ICD-10-CM

## 2024-11-12 LAB
ALBUMIN SERPL BCG-MCNC: 4.3 G/DL (ref 3.5–5.2)
ALP SERPL-CCNC: 80 U/L (ref 40–150)
ALT SERPL W P-5'-P-CCNC: 20 U/L (ref 0–70)
ANION GAP SERPL CALCULATED.3IONS-SCNC: 14 MMOL/L (ref 7–15)
AST SERPL W P-5'-P-CCNC: 22 U/L (ref 0–45)
BILIRUB SERPL-MCNC: 0.5 MG/DL
BUN SERPL-MCNC: 20.2 MG/DL (ref 8–23)
CALCIUM SERPL-MCNC: 9.3 MG/DL (ref 8.8–10.4)
CHLORIDE SERPL-SCNC: 103 MMOL/L (ref 98–107)
CREAT SERPL-MCNC: 0.99 MG/DL (ref 0.67–1.17)
EGFRCR SERPLBLD CKD-EPI 2021: 80 ML/MIN/1.73M2
EST. AVERAGE GLUCOSE BLD GHB EST-MCNC: 160 MG/DL
GLUCOSE SERPL-MCNC: 147 MG/DL (ref 70–99)
HBA1C MFR BLD: 7.2 % (ref 0–5.6)
HCO3 SERPL-SCNC: 21 MMOL/L (ref 22–29)
POTASSIUM SERPL-SCNC: 4.2 MMOL/L (ref 3.4–5.3)
PROT SERPL-MCNC: 7.4 G/DL (ref 6.4–8.3)
SODIUM SERPL-SCNC: 138 MMOL/L (ref 135–145)

## 2024-11-12 PROCEDURE — 36415 COLL VENOUS BLD VENIPUNCTURE: CPT

## 2024-11-12 PROCEDURE — 83036 HEMOGLOBIN GLYCOSYLATED A1C: CPT

## 2024-11-12 PROCEDURE — 80053 COMPREHEN METABOLIC PANEL: CPT

## 2024-11-13 ENCOUNTER — TRANSFERRED RECORDS (OUTPATIENT)
Dept: HEALTH INFORMATION MANAGEMENT | Facility: CLINIC | Age: 74
End: 2024-11-13
Payer: COMMERCIAL

## 2024-11-13 LAB — RETINOPATHY: NEGATIVE

## 2024-12-28 DIAGNOSIS — E78.5 HYPERLIPIDEMIA: ICD-10-CM

## 2024-12-30 RX ORDER — SIMVASTATIN 20 MG
TABLET ORAL
Qty: 90 TABLET | Refills: 1 | Status: SHIPPED | OUTPATIENT
Start: 2024-12-30

## 2025-01-28 ENCOUNTER — PATIENT OUTREACH (OUTPATIENT)
Dept: CARE COORDINATION | Facility: CLINIC | Age: 75
End: 2025-01-28
Payer: COMMERCIAL

## 2025-04-24 ENCOUNTER — TRANSFERRED RECORDS (OUTPATIENT)
Dept: HEALTH INFORMATION MANAGEMENT | Facility: CLINIC | Age: 75
End: 2025-04-24
Payer: COMMERCIAL

## 2025-04-26 ENCOUNTER — DOCUMENTATION ONLY (OUTPATIENT)
Dept: FAMILY MEDICINE | Facility: CLINIC | Age: 75
End: 2025-04-26
Payer: COMMERCIAL

## 2025-04-26 DIAGNOSIS — E11.69 TYPE 2 DIABETES MELLITUS WITH OTHER SPECIFIED COMPLICATION, WITHOUT LONG-TERM CURRENT USE OF INSULIN (H): ICD-10-CM

## 2025-04-26 DIAGNOSIS — E78.5 HYPERLIPIDEMIA, UNSPECIFIED HYPERLIPIDEMIA TYPE: Primary | ICD-10-CM

## 2025-04-26 NOTE — PROGRESS NOTES
Anksuh Zaidi has an upcoming lab appointment:    Future Appointments   Date Time Provider Department Center   4/28/2025  9:00 AM [facility]     Patient is scheduled for the following lab(s): A1[facility].    There is no order available. Please review and place either future orders or HMPO (Review of Health Maintenance Protocol Orders), as appropriate.    Health Maintenance Due   Topic    ANNUAL REVIEW OF HM ORDERS     HEPATITIS C SCREENING     MICROALBUMIN     LIPID      Maria Esther Brewer

## 2025-04-28 ENCOUNTER — LAB (OUTPATIENT)
Dept: LAB | Facility: CLINIC | Age: 75
End: 2025-04-28
Payer: COMMERCIAL

## 2025-04-28 DIAGNOSIS — E78.5 HYPERLIPIDEMIA, UNSPECIFIED HYPERLIPIDEMIA TYPE: ICD-10-CM

## 2025-04-28 DIAGNOSIS — E11.69 TYPE 2 DIABETES MELLITUS WITH OTHER SPECIFIED COMPLICATION, WITHOUT LONG-TERM CURRENT USE OF INSULIN (H): ICD-10-CM

## 2025-04-28 LAB
ALBUMIN SERPL BCG-MCNC: 4.4 G/DL (ref 3.5–5.2)
ALP SERPL-CCNC: 95 U/L (ref 40–150)
ALT SERPL W P-5'-P-CCNC: 19 U/L (ref 0–70)
ANION GAP SERPL CALCULATED.3IONS-SCNC: 11 MMOL/L (ref 7–15)
AST SERPL W P-5'-P-CCNC: 23 U/L (ref 0–45)
BASOPHILS # BLD AUTO: 0 10E3/UL (ref 0–0.2)
BASOPHILS NFR BLD AUTO: 0 %
BILIRUB SERPL-MCNC: 0.7 MG/DL
BUN SERPL-MCNC: 21.8 MG/DL (ref 8–23)
CALCIUM SERPL-MCNC: 9.2 MG/DL (ref 8.8–10.4)
CHLORIDE SERPL-SCNC: 106 MMOL/L (ref 98–107)
CHOLEST SERPL-MCNC: 152 MG/DL
CREAT SERPL-MCNC: 1.02 MG/DL (ref 0.67–1.17)
EGFRCR SERPLBLD CKD-EPI 2021: 77 ML/MIN/1.73M2
EOSINOPHIL # BLD AUTO: 0.4 10E3/UL (ref 0–0.7)
EOSINOPHIL NFR BLD AUTO: 3 %
ERYTHROCYTE [DISTWIDTH] IN BLOOD BY AUTOMATED COUNT: 11.8 % (ref 10–15)
EST. AVERAGE GLUCOSE BLD GHB EST-MCNC: 194 MG/DL
FASTING STATUS PATIENT QL REPORTED: ABNORMAL
FASTING STATUS PATIENT QL REPORTED: NORMAL
GLUCOSE SERPL-MCNC: 195 MG/DL (ref 70–99)
HBA1C MFR BLD: 8.4 % (ref 0–5.6)
HCO3 SERPL-SCNC: 25 MMOL/L (ref 22–29)
HCT VFR BLD AUTO: 47.3 % (ref 40–53)
HDLC SERPL-MCNC: 40 MG/DL
HGB BLD-MCNC: 16.3 G/DL (ref 13.3–17.7)
IMM GRANULOCYTES # BLD: 0 10E3/UL
IMM GRANULOCYTES NFR BLD: 0 %
LDLC SERPL CALC-MCNC: 94 MG/DL
LYMPHOCYTES # BLD AUTO: 3 10E3/UL (ref 0.8–5.3)
LYMPHOCYTES NFR BLD AUTO: 25 %
MCH RBC QN AUTO: 32.1 PG (ref 26.5–33)
MCHC RBC AUTO-ENTMCNC: 34.5 G/DL (ref 31.5–36.5)
MCV RBC AUTO: 93 FL (ref 78–100)
MONOCYTES # BLD AUTO: 1.1 10E3/UL (ref 0–1.3)
MONOCYTES NFR BLD AUTO: 9 %
NEUTROPHILS # BLD AUTO: 7.6 10E3/UL (ref 1.6–8.3)
NEUTROPHILS NFR BLD AUTO: 63 %
NONHDLC SERPL-MCNC: 112 MG/DL
PLATELET # BLD AUTO: 243 10E3/UL (ref 150–450)
POTASSIUM SERPL-SCNC: 4.5 MMOL/L (ref 3.4–5.3)
PROT SERPL-MCNC: 7.5 G/DL (ref 6.4–8.3)
RBC # BLD AUTO: 5.07 10E6/UL (ref 4.4–5.9)
SODIUM SERPL-SCNC: 142 MMOL/L (ref 135–145)
TRIGL SERPL-MCNC: 90 MG/DL
WBC # BLD AUTO: 12.1 10E3/UL (ref 4–11)

## 2025-04-28 PROCEDURE — 83036 HEMOGLOBIN GLYCOSYLATED A1C: CPT

## 2025-04-28 PROCEDURE — 36415 COLL VENOUS BLD VENIPUNCTURE: CPT

## 2025-04-28 PROCEDURE — 80053 COMPREHEN METABOLIC PANEL: CPT

## 2025-04-28 PROCEDURE — 85025 COMPLETE CBC W/AUTO DIFF WBC: CPT

## 2025-04-28 PROCEDURE — 80061 LIPID PANEL: CPT

## 2025-07-08 DIAGNOSIS — E11.69 TYPE 2 DIABETES MELLITUS WITH OTHER SPECIFIED COMPLICATION, WITHOUT LONG-TERM CURRENT USE OF INSULIN (H): ICD-10-CM

## 2025-07-08 RX ORDER — GLIPIZIDE 10 MG/1
10 TABLET, FILM COATED, EXTENDED RELEASE ORAL 2 TIMES DAILY
Qty: 180 TABLET | Refills: 0 | Status: SHIPPED | OUTPATIENT
Start: 2025-07-08

## 2025-08-12 ENCOUNTER — TRANSFERRED RECORDS (OUTPATIENT)
Dept: HEALTH INFORMATION MANAGEMENT | Facility: CLINIC | Age: 75
End: 2025-08-12
Payer: COMMERCIAL

## 2025-08-20 ENCOUNTER — LAB (OUTPATIENT)
Dept: LAB | Facility: CLINIC | Age: 75
End: 2025-08-20
Payer: COMMERCIAL

## 2025-08-20 DIAGNOSIS — D72.829 LEUKOCYTOSIS, UNSPECIFIED TYPE: ICD-10-CM

## 2025-08-20 DIAGNOSIS — E11.69 TYPE 2 DIABETES MELLITUS WITH OTHER SPECIFIED COMPLICATION, WITHOUT LONG-TERM CURRENT USE OF INSULIN (H): ICD-10-CM

## 2025-08-20 LAB
ANION GAP SERPL CALCULATED.3IONS-SCNC: 14 MMOL/L (ref 7–15)
BASOPHILS # BLD AUTO: <0.04 10E3/UL (ref 0–0.2)
BASOPHILS NFR BLD AUTO: 0.3 %
BUN SERPL-MCNC: 19.4 MG/DL (ref 8–23)
CALCIUM SERPL-MCNC: 9 MG/DL (ref 8.8–10.4)
CHLORIDE SERPL-SCNC: 104 MMOL/L (ref 98–107)
CREAT SERPL-MCNC: 0.99 MG/DL (ref 0.67–1.17)
EGFRCR SERPLBLD CKD-EPI 2021: 79 ML/MIN/1.73M2
EOSINOPHIL # BLD AUTO: 0.45 10E3/UL (ref 0–0.7)
EOSINOPHIL NFR BLD AUTO: 5.1 %
ERYTHROCYTE [DISTWIDTH] IN BLOOD BY AUTOMATED COUNT: 11.9 % (ref 10–15)
EST. AVERAGE GLUCOSE BLD GHB EST-MCNC: 157 MG/DL
GLUCOSE SERPL-MCNC: 123 MG/DL (ref 70–99)
HBA1C MFR BLD: 7.1 % (ref 0–5.6)
HCO3 SERPL-SCNC: 21 MMOL/L (ref 22–29)
HCT VFR BLD AUTO: 45.5 % (ref 40–53)
HGB BLD-MCNC: 16 G/DL (ref 13.3–17.7)
IMM GRANULOCYTES # BLD: <0.04 10E3/UL
IMM GRANULOCYTES NFR BLD: 0.3 %
LYMPHOCYTES # BLD AUTO: 2.56 10E3/UL (ref 0.8–5.3)
LYMPHOCYTES NFR BLD AUTO: 28.9 %
MCH RBC QN AUTO: 32.3 PG (ref 26.5–33)
MCHC RBC AUTO-ENTMCNC: 35.2 G/DL (ref 31.5–36.5)
MCV RBC AUTO: 91.7 FL (ref 78–100)
MONOCYTES # BLD AUTO: 0.91 10E3/UL (ref 0–1.3)
MONOCYTES NFR BLD AUTO: 10.3 %
NEUTROPHILS # BLD AUTO: 4.88 10E3/UL (ref 1.6–8.3)
NEUTROPHILS NFR BLD AUTO: 55.1 %
PLATELET # BLD AUTO: 219 10E3/UL (ref 150–450)
POTASSIUM SERPL-SCNC: 4.2 MMOL/L (ref 3.4–5.3)
RBC # BLD AUTO: 4.96 10E6/UL (ref 4.4–5.9)
SODIUM SERPL-SCNC: 139 MMOL/L (ref 135–145)
WBC # BLD AUTO: 8.86 10E3/UL (ref 4–11)

## 2025-08-20 PROCEDURE — 36415 COLL VENOUS BLD VENIPUNCTURE: CPT

## 2025-08-20 PROCEDURE — 83036 HEMOGLOBIN GLYCOSYLATED A1C: CPT

## 2025-08-20 PROCEDURE — 85025 COMPLETE CBC W/AUTO DIFF WBC: CPT

## 2025-08-20 PROCEDURE — 80048 BASIC METABOLIC PNL TOTAL CA: CPT

## 2025-09-01 DIAGNOSIS — E11.69 TYPE 2 DIABETES MELLITUS WITH OTHER SPECIFIED COMPLICATION, WITHOUT LONG-TERM CURRENT USE OF INSULIN (H): ICD-10-CM
